# Patient Record
Sex: MALE | Race: WHITE | Employment: OTHER | URBAN - METROPOLITAN AREA
[De-identification: names, ages, dates, MRNs, and addresses within clinical notes are randomized per-mention and may not be internally consistent; named-entity substitution may affect disease eponyms.]

---

## 2017-03-06 RX ORDER — METOPROLOL SUCCINATE 50 MG/1
TABLET, EXTENDED RELEASE ORAL
Qty: 90 TABLET | Refills: 1 | OUTPATIENT
Start: 2017-03-06

## 2017-03-09 RX ORDER — METOPROLOL SUCCINATE 50 MG/1
50 TABLET, EXTENDED RELEASE ORAL DAILY
Qty: 90 TABLET | Refills: 1 | Status: SHIPPED | OUTPATIENT
Start: 2017-03-09 | End: 2017-06-23 | Stop reason: SDUPTHER

## 2017-03-15 ENCOUNTER — OFFICE VISIT (OUTPATIENT)
Dept: INTERNAL MEDICINE CLINIC | Age: 57
End: 2017-03-15

## 2017-03-15 VITALS
TEMPERATURE: 99.3 F | WEIGHT: 209.4 LBS | OXYGEN SATURATION: 98 % | DIASTOLIC BLOOD PRESSURE: 82 MMHG | SYSTOLIC BLOOD PRESSURE: 138 MMHG | HEART RATE: 102 BPM | BODY MASS INDEX: 30.05 KG/M2

## 2017-03-15 DIAGNOSIS — J00 ACUTE RHINITIS: ICD-10-CM

## 2017-03-15 DIAGNOSIS — G89.29 CHRONIC TMJ PAIN: Primary | ICD-10-CM

## 2017-03-15 DIAGNOSIS — J30.0 NONALLERGIC VASOMOTOR RHINITIS: ICD-10-CM

## 2017-03-15 DIAGNOSIS — M26.629 CHRONIC TMJ PAIN: Primary | ICD-10-CM

## 2017-03-15 PROCEDURE — 99213 OFFICE O/P EST LOW 20 MIN: CPT | Performed by: FAMILY MEDICINE

## 2017-03-15 RX ORDER — AZITHROMYCIN 250 MG/1
TABLET, FILM COATED ORAL
Qty: 1 PACKET | Refills: 0 | Status: SHIPPED | OUTPATIENT
Start: 2017-03-15 | End: 2018-02-08 | Stop reason: SDUPTHER

## 2017-03-15 RX ORDER — NAPROXEN 375 MG/1
375 TABLET ORAL 3 TIMES DAILY PRN
Qty: 60 TABLET | Refills: 1 | Status: SHIPPED | OUTPATIENT
Start: 2017-03-15 | End: 2017-12-26 | Stop reason: ALTCHOICE

## 2017-03-15 RX ORDER — PREDNISONE 20 MG/1
20 TABLET ORAL DAILY
Qty: 10 TABLET | Refills: 0 | Status: SHIPPED | OUTPATIENT
Start: 2017-03-15 | End: 2018-02-08 | Stop reason: SDUPTHER

## 2017-03-15 ASSESSMENT — ENCOUNTER SYMPTOMS
VOICE CHANGE: 0
CONSTIPATION: 0
FACIAL SWELLING: 0
TROUBLE SWALLOWING: 0
RHINORRHEA: 1
BLOOD IN STOOL: 0
COUGH: 0
SHORTNESS OF BREATH: 0
ABDOMINAL PAIN: 0
DIARRHEA: 0
WHEEZING: 0

## 2017-03-17 ENCOUNTER — TELEPHONE (OUTPATIENT)
Dept: INTERNAL MEDICINE CLINIC | Age: 57
End: 2017-03-17

## 2017-03-17 RX ORDER — PROMETHAZINE HYDROCHLORIDE AND CODEINE PHOSPHATE 6.25; 1 MG/5ML; MG/5ML
SYRUP ORAL
Qty: 240 ML | Refills: 0 | Status: SHIPPED | OUTPATIENT
Start: 2017-03-17 | End: 2017-05-12 | Stop reason: ALTCHOICE

## 2017-04-03 ENCOUNTER — TELEPHONE (OUTPATIENT)
Dept: INTERNAL MEDICINE CLINIC | Age: 57
End: 2017-04-03

## 2017-05-12 ENCOUNTER — OFFICE VISIT (OUTPATIENT)
Dept: INTERNAL MEDICINE CLINIC | Age: 57
End: 2017-05-12

## 2017-05-12 VITALS
HEART RATE: 72 BPM | TEMPERATURE: 98 F | RESPIRATION RATE: 18 BRPM | WEIGHT: 208.6 LBS | DIASTOLIC BLOOD PRESSURE: 80 MMHG | BODY MASS INDEX: 30.9 KG/M2 | OXYGEN SATURATION: 100 % | HEIGHT: 69 IN | SYSTOLIC BLOOD PRESSURE: 134 MMHG

## 2017-05-12 DIAGNOSIS — R30.0 DYSURIA: Primary | ICD-10-CM

## 2017-05-12 LAB
BILIRUBIN, POC: NEGATIVE
BLOOD URINE, POC: NEGATIVE
CLARITY, POC: CLEAR
COLOR, POC: NORMAL
GLUCOSE URINE, POC: NEGATIVE
KETONES, POC: NEGATIVE
LEUKOCYTE EST, POC: NEGATIVE
NITRITE, POC: NEGATIVE
PH, POC: 5
PROTEIN, POC: NEGATIVE
SPECIFIC GRAVITY, POC: 1
UROBILINOGEN, POC: 0.2

## 2017-05-12 PROCEDURE — 81002 URINALYSIS NONAUTO W/O SCOPE: CPT | Performed by: FAMILY MEDICINE

## 2017-05-12 PROCEDURE — 99212 OFFICE O/P EST SF 10 MIN: CPT | Performed by: FAMILY MEDICINE

## 2017-05-12 RX ORDER — FLUTICASONE PROPIONATE 50 MCG
SPRAY, SUSPENSION (ML) NASAL
Qty: 48 G | Refills: 1 | Status: SHIPPED | OUTPATIENT
Start: 2017-05-12 | End: 2017-09-21 | Stop reason: SDUPTHER

## 2017-05-12 RX ORDER — PHENAZOPYRIDINE HYDROCHLORIDE 200 MG/1
200 TABLET, FILM COATED ORAL 3 TIMES DAILY PRN
Qty: 10 TABLET | Refills: 1 | Status: SHIPPED | OUTPATIENT
Start: 2017-05-12 | End: 2017-05-15

## 2017-05-12 RX ORDER — LEVOFLOXACIN 500 MG/1
500 TABLET, FILM COATED ORAL DAILY
Qty: 2 TABLET | Refills: 0 | Status: SHIPPED | OUTPATIENT
Start: 2017-05-12 | End: 2017-05-14

## 2017-05-14 ASSESSMENT — ENCOUNTER SYMPTOMS
DIARRHEA: 0
ABDOMINAL PAIN: 0
VOICE CHANGE: 0
TROUBLE SWALLOWING: 0
BLOOD IN STOOL: 0
SHORTNESS OF BREATH: 0
CONSTIPATION: 0

## 2017-05-22 RX ORDER — DOXYCYCLINE HYCLATE 50 MG/1
50 CAPSULE ORAL 2 TIMES DAILY
Qty: 14 CAPSULE | Refills: 0 | Status: SHIPPED | OUTPATIENT
Start: 2017-05-22 | End: 2017-05-29

## 2017-06-01 ENCOUNTER — OFFICE VISIT (OUTPATIENT)
Dept: INTERNAL MEDICINE CLINIC | Age: 57
End: 2017-06-01

## 2017-06-01 VITALS
OXYGEN SATURATION: 100 % | DIASTOLIC BLOOD PRESSURE: 98 MMHG | RESPIRATION RATE: 18 BRPM | WEIGHT: 207 LBS | SYSTOLIC BLOOD PRESSURE: 164 MMHG | HEART RATE: 66 BPM | BODY MASS INDEX: 30.57 KG/M2

## 2017-06-01 DIAGNOSIS — Z11.3 SCREENING FOR STD (SEXUALLY TRANSMITTED DISEASE): ICD-10-CM

## 2017-06-01 DIAGNOSIS — I10 ESSENTIAL HYPERTENSION: ICD-10-CM

## 2017-06-01 DIAGNOSIS — N47.1 PHIMOSIS: ICD-10-CM

## 2017-06-01 PROCEDURE — 99213 OFFICE O/P EST LOW 20 MIN: CPT | Performed by: FAMILY MEDICINE

## 2017-06-01 RX ORDER — DOXYCYCLINE HYCLATE 100 MG/1
100 CAPSULE ORAL 2 TIMES DAILY
Qty: 28 CAPSULE | Refills: 0 | Status: SHIPPED | OUTPATIENT
Start: 2017-06-01 | End: 2017-06-15

## 2017-06-02 LAB — RPR: NORMAL

## 2017-06-03 LAB
HIV-1 AND HIV-2 ANTIBODIES: NEGATIVE
HSV 2 GLYCOPROTEIN G AB IGG: 0.11 IV

## 2017-06-04 ASSESSMENT — ENCOUNTER SYMPTOMS
DIARRHEA: 0
TROUBLE SWALLOWING: 0
VOICE CHANGE: 0
ABDOMINAL PAIN: 0
SHORTNESS OF BREATH: 0
BLOOD IN STOOL: 0
CONSTIPATION: 0

## 2017-06-23 ENCOUNTER — OFFICE VISIT (OUTPATIENT)
Dept: INTERNAL MEDICINE CLINIC | Age: 57
End: 2017-06-23

## 2017-06-23 VITALS
BODY MASS INDEX: 30.57 KG/M2 | OXYGEN SATURATION: 99 % | SYSTOLIC BLOOD PRESSURE: 170 MMHG | HEART RATE: 78 BPM | DIASTOLIC BLOOD PRESSURE: 96 MMHG | RESPIRATION RATE: 18 BRPM | WEIGHT: 207 LBS

## 2017-06-23 DIAGNOSIS — I42.9 CARDIOMYOPATHY (HCC): ICD-10-CM

## 2017-06-23 DIAGNOSIS — N48.9 PENILE LESION: ICD-10-CM

## 2017-06-23 DIAGNOSIS — I10 ESSENTIAL HYPERTENSION: ICD-10-CM

## 2017-06-23 DIAGNOSIS — Z23 NEED FOR PNEUMOCOCCAL VACCINATION: ICD-10-CM

## 2017-06-23 PROCEDURE — 90471 IMMUNIZATION ADMIN: CPT | Performed by: FAMILY MEDICINE

## 2017-06-23 PROCEDURE — 99214 OFFICE O/P EST MOD 30 MIN: CPT | Performed by: FAMILY MEDICINE

## 2017-06-23 PROCEDURE — 90732 PPSV23 VACC 2 YRS+ SUBQ/IM: CPT | Performed by: FAMILY MEDICINE

## 2017-06-23 RX ORDER — METOPROLOL SUCCINATE 100 MG/1
100 TABLET, EXTENDED RELEASE ORAL DAILY
Qty: 90 TABLET | Refills: 3 | Status: SHIPPED | OUTPATIENT
Start: 2017-06-23 | End: 2017-12-26 | Stop reason: SDUPTHER

## 2017-06-23 ASSESSMENT — ENCOUNTER SYMPTOMS
BLOOD IN STOOL: 0
CONSTIPATION: 0
DIARRHEA: 0
SHORTNESS OF BREATH: 0
VOICE CHANGE: 0
ABDOMINAL PAIN: 0
TROUBLE SWALLOWING: 0

## 2017-06-26 RX ORDER — ATORVASTATIN CALCIUM 40 MG/1
TABLET, FILM COATED ORAL
Qty: 90 TABLET | Refills: 1 | Status: SHIPPED | OUTPATIENT
Start: 2017-06-26 | End: 2017-12-26 | Stop reason: SDUPTHER

## 2017-06-26 RX ORDER — BENAZEPRIL HYDROCHLORIDE 40 MG/1
TABLET, FILM COATED ORAL
Qty: 90 TABLET | Refills: 1 | Status: SHIPPED | OUTPATIENT
Start: 2017-06-26 | End: 2017-12-26 | Stop reason: SDUPTHER

## 2017-06-26 RX ORDER — FLUTICASONE PROPIONATE 50 MCG
SPRAY, SUSPENSION (ML) NASAL
Qty: 48 G | Refills: 1 | Status: SHIPPED | OUTPATIENT
Start: 2017-06-26 | End: 2018-01-30 | Stop reason: SDUPTHER

## 2017-09-21 RX ORDER — BETAMETHASONE DIPROPIONATE 0.5 MG/G
LOTION TOPICAL 2 TIMES DAILY
Qty: 120 ML | Refills: 3 | Status: SHIPPED | OUTPATIENT
Start: 2017-09-21 | End: 2018-01-30 | Stop reason: SDUPTHER

## 2017-12-07 RX ORDER — ATORVASTATIN CALCIUM 40 MG/1
TABLET, FILM COATED ORAL
Qty: 90 TABLET | Refills: 1 | OUTPATIENT
Start: 2017-12-07

## 2017-12-07 RX ORDER — BENAZEPRIL HYDROCHLORIDE 40 MG/1
TABLET, FILM COATED ORAL
Qty: 90 TABLET | Refills: 1 | OUTPATIENT
Start: 2017-12-07

## 2017-12-26 ENCOUNTER — OFFICE VISIT (OUTPATIENT)
Dept: INTERNAL MEDICINE CLINIC | Age: 57
End: 2017-12-26

## 2017-12-26 VITALS
HEIGHT: 69 IN | SYSTOLIC BLOOD PRESSURE: 182 MMHG | DIASTOLIC BLOOD PRESSURE: 92 MMHG | RESPIRATION RATE: 20 BRPM | OXYGEN SATURATION: 99 % | BODY MASS INDEX: 31.22 KG/M2 | WEIGHT: 210.8 LBS | HEART RATE: 80 BPM

## 2017-12-26 DIAGNOSIS — G89.29 CHRONIC TMJ PAIN: ICD-10-CM

## 2017-12-26 DIAGNOSIS — I10 ESSENTIAL HYPERTENSION: Primary | ICD-10-CM

## 2017-12-26 DIAGNOSIS — M26.629 CHRONIC TMJ PAIN: ICD-10-CM

## 2017-12-26 DIAGNOSIS — J30.0 NONALLERGIC VASOMOTOR RHINITIS: ICD-10-CM

## 2017-12-26 DIAGNOSIS — H93.8X3 FULLNESS IN EAR, BILATERAL: ICD-10-CM

## 2017-12-26 PROCEDURE — 99214 OFFICE O/P EST MOD 30 MIN: CPT | Performed by: FAMILY MEDICINE

## 2017-12-26 RX ORDER — ATORVASTATIN CALCIUM 40 MG/1
TABLET, FILM COATED ORAL
Qty: 90 TABLET | Refills: 1 | Status: SHIPPED | OUTPATIENT
Start: 2017-12-26 | End: 2018-09-25

## 2017-12-26 RX ORDER — METOPROLOL SUCCINATE 100 MG/1
TABLET, EXTENDED RELEASE ORAL
Qty: 135 TABLET | Refills: 1 | Status: SHIPPED | OUTPATIENT
Start: 2017-12-26 | End: 2018-01-30 | Stop reason: SDUPTHER

## 2017-12-26 RX ORDER — BENAZEPRIL HYDROCHLORIDE 40 MG/1
TABLET, FILM COATED ORAL
Qty: 90 TABLET | Refills: 1 | Status: SHIPPED | OUTPATIENT
Start: 2017-12-26 | End: 2018-09-25

## 2017-12-26 ASSESSMENT — PATIENT HEALTH QUESTIONNAIRE - PHQ9
SUM OF ALL RESPONSES TO PHQ QUESTIONS 1-9: 0
SUM OF ALL RESPONSES TO PHQ9 QUESTIONS 1 & 2: 0
1. LITTLE INTEREST OR PLEASURE IN DOING THINGS: 0
2. FEELING DOWN, DEPRESSED OR HOPELESS: 0

## 2017-12-26 ASSESSMENT — ENCOUNTER SYMPTOMS
CONSTIPATION: 0
TROUBLE SWALLOWING: 0
SHORTNESS OF BREATH: 0
VOICE CHANGE: 0
DIARRHEA: 0
ABDOMINAL PAIN: 0
BLOOD IN STOOL: 0

## 2017-12-26 NOTE — PROGRESS NOTES
noted.   Psychiatric: He has a normal mood and affect. His behavior is normal.       Assessment:      1. Essential hypertension -blood pressure is elevated. we will increase the Toprol-XL to 100 mg in the morning and 50 mg at night. Continue Lotensin 40 mg daily. Patient's  BP machine seems to be not working great and was advised to try something different may be Omron 3 or 5 series   2. Nonallergic vasomotor rhinitis -continue Flonase nasally spray   3. Fullness in ear, bilateral - exam is unremarkable there is no significant earwax that requires lavage. The sensation of fullness probably is related to nasal congestion from vasomotor rhinitis   4. Chronic TMJ pain - agree with a trial of Celebrex 200 mg daily for one month            Plan:      As above.  RTC in 1 mo to check BP and for blood test

## 2018-01-30 ENCOUNTER — OFFICE VISIT (OUTPATIENT)
Dept: INTERNAL MEDICINE CLINIC | Age: 58
End: 2018-01-30

## 2018-01-30 DIAGNOSIS — M26.609 TMJ (TEMPOROMANDIBULAR JOINT SYNDROME): ICD-10-CM

## 2018-01-30 DIAGNOSIS — I42.8 OTHER CARDIOMYOPATHY (HCC): ICD-10-CM

## 2018-01-30 DIAGNOSIS — J30.0 NONALLERGIC VASOMOTOR RHINITIS: ICD-10-CM

## 2018-01-30 DIAGNOSIS — Z12.5 SCREENING PSA (PROSTATE SPECIFIC ANTIGEN): ICD-10-CM

## 2018-01-30 DIAGNOSIS — Z00.00 PREVENTATIVE HEALTH CARE: Primary | ICD-10-CM

## 2018-01-30 DIAGNOSIS — K58.0 IRRITABLE BOWEL SYNDROME WITH DIARRHEA: ICD-10-CM

## 2018-01-30 DIAGNOSIS — I10 ESSENTIAL HYPERTENSION: ICD-10-CM

## 2018-01-30 DIAGNOSIS — Z11.59 ENCOUNTER FOR HEPATITIS C SCREENING TEST FOR LOW RISK PATIENT: ICD-10-CM

## 2018-01-30 LAB
A/G RATIO: 2.4 (ref 1.1–2.2)
ALBUMIN SERPL-MCNC: 4.6 G/DL (ref 3.4–5)
ALP BLD-CCNC: 104 U/L (ref 40–129)
ALT SERPL-CCNC: 44 U/L (ref 10–40)
ANION GAP SERPL CALCULATED.3IONS-SCNC: 14 MMOL/L (ref 3–16)
AST SERPL-CCNC: 35 U/L (ref 15–37)
BASOPHILS ABSOLUTE: 0.1 K/UL (ref 0–0.2)
BASOPHILS RELATIVE PERCENT: 1.6 %
BILIRUB SERPL-MCNC: 0.7 MG/DL (ref 0–1)
BUN BLDV-MCNC: 9 MG/DL (ref 7–20)
CALCIUM SERPL-MCNC: 9.1 MG/DL (ref 8.3–10.6)
CHLORIDE BLD-SCNC: 91 MMOL/L (ref 99–110)
CHOLESTEROL, TOTAL: 154 MG/DL (ref 0–199)
CO2: 24 MMOL/L (ref 21–32)
CREAT SERPL-MCNC: 0.7 MG/DL (ref 0.9–1.3)
EOSINOPHILS ABSOLUTE: 0.3 K/UL (ref 0–0.6)
EOSINOPHILS RELATIVE PERCENT: 5.9 %
GFR AFRICAN AMERICAN: >60
GFR NON-AFRICAN AMERICAN: >60
GLOBULIN: 1.9 G/DL
GLUCOSE BLD-MCNC: 102 MG/DL (ref 70–99)
HCT VFR BLD CALC: 44.9 % (ref 40.5–52.5)
HDLC SERPL-MCNC: 60 MG/DL (ref 40–60)
HEMOGLOBIN: 15.2 G/DL (ref 13.5–17.5)
HEPATITIS C ANTIBODY INTERPRETATION: NORMAL
LDL CHOLESTEROL CALCULATED: 79 MG/DL
LYMPHOCYTES ABSOLUTE: 1.6 K/UL (ref 1–5.1)
LYMPHOCYTES RELATIVE PERCENT: 27.4 %
MCH RBC QN AUTO: 32.8 PG (ref 26–34)
MCHC RBC AUTO-ENTMCNC: 33.7 G/DL (ref 31–36)
MCV RBC AUTO: 97.2 FL (ref 80–100)
MONOCYTES ABSOLUTE: 0.8 K/UL (ref 0–1.3)
MONOCYTES RELATIVE PERCENT: 14.4 %
NEUTROPHILS ABSOLUTE: 2.9 K/UL (ref 1.7–7.7)
NEUTROPHILS RELATIVE PERCENT: 50.7 %
PDW BLD-RTO: 13 % (ref 12.4–15.4)
PLATELET # BLD: 200 K/UL (ref 135–450)
PMV BLD AUTO: 8.3 FL (ref 5–10.5)
POTASSIUM SERPL-SCNC: 4.7 MMOL/L (ref 3.5–5.1)
PROSTATE SPECIFIC ANTIGEN: 0.71 NG/ML (ref 0–4)
RBC # BLD: 4.62 M/UL (ref 4.2–5.9)
SODIUM BLD-SCNC: 129 MMOL/L (ref 136–145)
T4 FREE: 1.1 NG/DL (ref 0.9–1.8)
TOTAL PROTEIN: 6.5 G/DL (ref 6.4–8.2)
TRIGL SERPL-MCNC: 74 MG/DL (ref 0–150)
TSH SERPL DL<=0.05 MIU/L-ACNC: 2.18 UIU/ML (ref 0.27–4.2)
VLDLC SERPL CALC-MCNC: 15 MG/DL
WBC # BLD: 5.7 K/UL (ref 4–11)

## 2018-01-30 PROCEDURE — 99396 PREV VISIT EST AGE 40-64: CPT | Performed by: FAMILY MEDICINE

## 2018-01-30 PROCEDURE — 36415 COLL VENOUS BLD VENIPUNCTURE: CPT | Performed by: FAMILY MEDICINE

## 2018-01-30 RX ORDER — FLUTICASONE PROPIONATE 50 MCG
SPRAY, SUSPENSION (ML) NASAL
Qty: 48 G | Refills: 1 | Status: SHIPPED | OUTPATIENT
Start: 2018-01-30 | End: 2018-09-24 | Stop reason: SDUPTHER

## 2018-01-30 RX ORDER — BETAMETHASONE DIPROPIONATE 0.5 MG/G
LOTION TOPICAL 2 TIMES DAILY
Qty: 180 ML | Refills: 1 | Status: SHIPPED | OUTPATIENT
Start: 2018-01-30 | End: 2019-01-07

## 2018-01-30 RX ORDER — METOPROLOL SUCCINATE 100 MG/1
100 TABLET, EXTENDED RELEASE ORAL 2 TIMES DAILY
Qty: 180 TABLET | Refills: 1 | Status: SHIPPED | OUTPATIENT
Start: 2018-01-30 | End: 2018-09-25 | Stop reason: CLARIF

## 2018-01-30 ASSESSMENT — ENCOUNTER SYMPTOMS
CONSTIPATION: 0
ABDOMINAL PAIN: 0
VOICE CHANGE: 0
DIARRHEA: 0
BLOOD IN STOOL: 0
TROUBLE SWALLOWING: 0
SHORTNESS OF BREATH: 0

## 2018-01-30 NOTE — PROGRESS NOTES
Subjective:      Patient ID: Wale Weathers is a 61 y.o. male. HPI  Patient presented to the office for annual preventive healthcare. He has no particular complaint except for right wrist pain for which is scheduled to see a hand surgeon. He has had TMJ-'s saw a dentist who prescribed dental guard but did not help much he is scheduled to see orthodontist. Cardiac wise he is doing very well cardiomyopathy is considered back to normal. Blood pressure is fairly controlled although at times when under a lot of stress blood pressure is somewhat elevated. Has nonallergic rhinitis comes and goes. He takes steroids nasal spray which helps somewhat  Review of Systems   Constitutional: Negative for activity change. HENT: Negative for trouble swallowing and voice change. Eyes: Negative for visual disturbance. Respiratory: Negative for shortness of breath. Cardiovascular: Negative for chest pain and leg swelling. Gastrointestinal: Negative for abdominal pain, blood in stool, constipation and diarrhea. Genitourinary: Negative for difficulty urinating, dysuria, frequency, hematuria and scrotal swelling. Musculoskeletal: Negative for arthralgias and myalgias. Skin: Negative for rash. Neurological: Negative for dizziness. Psychiatric/Behavioral: Negative for behavioral problems. Objective:   Physical Exam   Constitutional: He is oriented to person, place, and time. He appears well-developed and well-nourished. No distress. HENT:   Head: Normocephalic. Eyes: Conjunctivae are normal.   Neck: Neck supple. No thyromegaly present. Cardiovascular: Normal rate, regular rhythm and normal heart sounds. No murmur heard. Pulmonary/Chest: Breath sounds normal. No respiratory distress. He has no wheezes. He has no rales. Abdominal: Soft. He exhibits no distension. Musculoskeletal: Normal range of motion. He exhibits no edema. Neurological: He is alert and oriented to person, place, and time. Skin: Skin is warm. No rash noted. Psychiatric: He has a normal mood and affect. His behavior is normal.       Assessment:      1. Preventative health care -Will draw blood and update health maintenance record    2. Essential hypertension - fairly controlled although at times been under a lot of stress blood pressure goes up. He takes Toprol- mg twice a day and Lotensin 40 mg daily    3. Nonallergic vasomotor rhinitis - continue Flonase nasally spray. Will add Atrovent nasally spray next visit if the still have problem with tendinitis    4. Irritable bowel syndrome with diarrhea - doing fairly well it seems he is in remission for almost a year. Continue high-fiber diet    5. Other cardiomyopathy ([facility] [address] - His recent echo showed normal LV function. He practically slow down on alcohol    6. TMJ (temporomandibular joint syndrome) - Follow-up with the dental surgeon or orthodontist    7. Screening PSA (prostate specific antigen) - draw blood for PSA    8. Encounter for hepatitis C screening test for low risk patient - draw blood for hepatitis C serology             Plan:      As above.  RTC in 3-6 mos and PRN

## 2018-01-31 ENCOUNTER — OFFICE VISIT (OUTPATIENT)
Dept: ORTHOPEDIC SURGERY | Age: 58
End: 2018-01-31

## 2018-01-31 VITALS
WEIGHT: 211 LBS | SYSTOLIC BLOOD PRESSURE: 161 MMHG | BODY MASS INDEX: 31.62 KG/M2 | DIASTOLIC BLOOD PRESSURE: 94 MMHG | HEART RATE: 61 BPM

## 2018-01-31 DIAGNOSIS — G89.29 WRIST PAIN, CHRONIC, RIGHT: Primary | ICD-10-CM

## 2018-01-31 DIAGNOSIS — M25.531 WRIST PAIN, CHRONIC, RIGHT: Primary | ICD-10-CM

## 2018-01-31 PROCEDURE — L3924 HFO WITHOUT JOINTS PRE OTS: HCPCS | Performed by: ORTHOPAEDIC SURGERY

## 2018-01-31 PROCEDURE — 99243 OFF/OP CNSLTJ NEW/EST LOW 30: CPT | Performed by: ORTHOPAEDIC SURGERY

## 2018-02-01 VITALS
WEIGHT: 211 LBS | DIASTOLIC BLOOD PRESSURE: 84 MMHG | HEART RATE: 70 BPM | OXYGEN SATURATION: 99 % | RESPIRATION RATE: 16 BRPM | BODY MASS INDEX: 31.62 KG/M2 | SYSTOLIC BLOOD PRESSURE: 134 MMHG

## 2018-02-01 NOTE — PROGRESS NOTES
Mr. Hay De Anda is a 61 y.o. right handed man  who is seen today in Hand Surgical Consultation at the request of Nadya Borja MD.    He is seen today regarding a 6 month(s) history of right basilar thumb & wrist pain with history previous of injury while golfing. He  was seen for this concern by his primary care physician; previous treatment has included has avoided aggravating activity for a few weeks, which has been somewhat effective. He  reports moderate pain located about the base of the right thumbs at the level of the [facility] Joint, no tenderness of the remaining hand, wrist, or elbow on either side. He notes today, no neurologic symptoms in the hands. Symptoms show no change over time. The patient's , past medical history, medications, allergies,  family history, social history, and review of systems have been reviewed, and dated and are recorded in the chart. Physical Exam:  Mr. Denise An's most recent vitals:  Vitals  BP: (!) 161/94  Pulse: 61  Weight: 211 lb (95.7 kg)    He is well nourished, oriented to person, place & time. He demonstrates appropriate mood and affect as well as normal gait and station. Skin: Skin color, texture, turgor normal. No rashes or lesions bilaterally. Digital range of motion is full and equal bilateral otherwise normal bilaterally. Thumb range of motion is mlldy decreased in abduction at the basilar joint on the Right, normal on the Left   Wrist range of motion is mildly accompanied by radial pain on the Right, normal on the Left  There is no evidence of gross joint instability bilaterally. Sensation is subjectively normal in the Whole Hand on the Right, normal on the Left  Vascular examination reveals normal and good capillary refill bilaterally  Swelling/enlargement is mild in the base of the thumb on the Right, normal on the Left  Maximal pain is elicited with palpation of the STT joint on the Right, normal on the Left.   The remainder of the hands

## 2018-02-08 ENCOUNTER — TELEPHONE (OUTPATIENT)
Dept: INTERNAL MEDICINE CLINIC | Age: 58
End: 2018-02-08

## 2018-02-08 RX ORDER — AZITHROMYCIN 250 MG/1
TABLET, FILM COATED ORAL
Qty: 1 PACKET | Refills: 0 | Status: SHIPPED | OUTPATIENT
Start: 2018-02-08 | End: 2019-01-04 | Stop reason: SDUPTHER

## 2018-02-08 RX ORDER — PREDNISONE 20 MG/1
20 TABLET ORAL DAILY
Qty: 10 TABLET | Refills: 0 | Status: SHIPPED | OUTPATIENT
Start: 2018-02-08 | End: 2018-02-18

## 2018-05-08 ENCOUNTER — OFFICE VISIT (OUTPATIENT)
Dept: INTERNAL MEDICINE CLINIC | Age: 58
End: 2018-05-08

## 2018-05-08 VITALS
HEART RATE: 66 BPM | SYSTOLIC BLOOD PRESSURE: 166 MMHG | RESPIRATION RATE: 20 BRPM | OXYGEN SATURATION: 100 % | DIASTOLIC BLOOD PRESSURE: 88 MMHG

## 2018-05-08 DIAGNOSIS — R73.09 ELEVATED GLUCOSE: ICD-10-CM

## 2018-05-08 DIAGNOSIS — F41.1 GAD (GENERALIZED ANXIETY DISORDER): ICD-10-CM

## 2018-05-08 DIAGNOSIS — S13.9XXA CERVICAL SPRAIN, INITIAL ENCOUNTER: Primary | ICD-10-CM

## 2018-05-08 DIAGNOSIS — I10 ESSENTIAL HYPERTENSION: ICD-10-CM

## 2018-05-08 LAB — HBA1C MFR BLD: 5.6 %

## 2018-05-08 PROCEDURE — 99214 OFFICE O/P EST MOD 30 MIN: CPT | Performed by: FAMILY MEDICINE

## 2018-05-08 PROCEDURE — 83036 HEMOGLOBIN GLYCOSYLATED A1C: CPT | Performed by: FAMILY MEDICINE

## 2018-05-08 RX ORDER — IBUPROFEN 600 MG/1
600 TABLET ORAL 3 TIMES DAILY PRN
Qty: 60 TABLET | Refills: 1 | Status: SHIPPED | OUTPATIENT
Start: 2018-05-08 | End: 2021-08-24

## 2018-05-08 RX ORDER — ESCITALOPRAM OXALATE 10 MG/1
10 TABLET ORAL DAILY
Qty: 30 TABLET | Refills: 3 | Status: SHIPPED | OUTPATIENT
Start: 2018-05-08 | End: 2019-03-06

## 2018-05-08 ASSESSMENT — ENCOUNTER SYMPTOMS
DIARRHEA: 0
VOICE CHANGE: 0
TROUBLE SWALLOWING: 0
ABDOMINAL PAIN: 0
CONSTIPATION: 0
SHORTNESS OF BREATH: 0
BLOOD IN STOOL: 0

## 2018-05-15 ENCOUNTER — OFFICE VISIT (OUTPATIENT)
Dept: ORTHOPEDIC SURGERY | Age: 58
End: 2018-05-15

## 2018-05-15 VITALS
BODY MASS INDEX: 30.06 KG/M2 | DIASTOLIC BLOOD PRESSURE: 95 MMHG | HEART RATE: 69 BPM | HEIGHT: 70 IN | WEIGHT: 210 LBS | SYSTOLIC BLOOD PRESSURE: 162 MMHG

## 2018-05-15 DIAGNOSIS — M47.812 CERVICAL SPINE ARTHRITIS: ICD-10-CM

## 2018-05-15 DIAGNOSIS — M25.512 LEFT SHOULDER PAIN, UNSPECIFIED CHRONICITY: Primary | ICD-10-CM

## 2018-05-15 PROCEDURE — 99215 OFFICE O/P EST HI 40 MIN: CPT | Performed by: ORTHOPAEDIC SURGERY

## 2018-05-15 ASSESSMENT — ENCOUNTER SYMPTOMS
BACK PAIN: 1
SORE THROAT: 1
SINUS PAIN: 1

## 2018-06-26 RX ORDER — ATORVASTATIN CALCIUM 40 MG/1
TABLET, FILM COATED ORAL
Qty: 90 TABLET | Refills: 1 | Status: SHIPPED | OUTPATIENT
Start: 2018-06-26 | End: 2018-12-19 | Stop reason: SDUPTHER

## 2018-06-26 RX ORDER — BENAZEPRIL HYDROCHLORIDE 40 MG/1
TABLET, FILM COATED ORAL
Qty: 90 TABLET | Refills: 1 | Status: SHIPPED | OUTPATIENT
Start: 2018-06-26 | End: 2018-12-19 | Stop reason: SDUPTHER

## 2018-07-05 RX ORDER — BENAZEPRIL HYDROCHLORIDE 40 MG/1
TABLET, FILM COATED ORAL
Qty: 90 TABLET | Refills: 1 | OUTPATIENT
Start: 2018-07-05

## 2018-07-05 RX ORDER — ATORVASTATIN CALCIUM 40 MG/1
TABLET, FILM COATED ORAL
Qty: 90 TABLET | Refills: 1 | OUTPATIENT
Start: 2018-07-05

## 2018-09-25 RX ORDER — FLUTICASONE PROPIONATE 50 MCG
SPRAY, SUSPENSION (ML) NASAL
Qty: 48 G | Refills: 1 | Status: SHIPPED | OUTPATIENT
Start: 2018-09-25 | End: 2019-03-06 | Stop reason: SDUPTHER

## 2018-09-25 RX ORDER — FLUTICASONE PROPIONATE 50 MCG
SPRAY, SUSPENSION (ML) NASAL
Qty: 48 G | Refills: 1 | OUTPATIENT
Start: 2018-09-25

## 2018-09-25 RX ORDER — METOPROLOL SUCCINATE 100 MG/1
TABLET, EXTENDED RELEASE ORAL
Qty: 135 TABLET | Refills: 1 | Status: SHIPPED | OUTPATIENT
Start: 2018-09-25 | End: 2019-01-07 | Stop reason: SDUPTHER

## 2018-09-25 RX ORDER — METOPROLOL SUCCINATE 100 MG/1
100 TABLET, EXTENDED RELEASE ORAL 2 TIMES DAILY
Qty: 180 TABLET | Refills: 1 | OUTPATIENT
Start: 2018-09-25

## 2018-12-19 RX ORDER — BENAZEPRIL HYDROCHLORIDE 40 MG/1
TABLET, FILM COATED ORAL
Qty: 90 TABLET | Refills: 1 | Status: SHIPPED | OUTPATIENT
Start: 2018-12-19 | End: 2019-06-10 | Stop reason: SDUPTHER

## 2018-12-19 RX ORDER — ATORVASTATIN CALCIUM 40 MG/1
TABLET, FILM COATED ORAL
Qty: 90 TABLET | Refills: 1 | Status: SHIPPED | OUTPATIENT
Start: 2018-12-19 | End: 2019-06-10 | Stop reason: SDUPTHER

## 2019-01-07 ENCOUNTER — OFFICE VISIT (OUTPATIENT)
Dept: INTERNAL MEDICINE CLINIC | Age: 59
End: 2019-01-07
Payer: COMMERCIAL

## 2019-01-07 VITALS
RESPIRATION RATE: 18 BRPM | TEMPERATURE: 98.2 F | WEIGHT: 212.6 LBS | DIASTOLIC BLOOD PRESSURE: 91 MMHG | BODY MASS INDEX: 30.5 KG/M2 | SYSTOLIC BLOOD PRESSURE: 161 MMHG | HEART RATE: 74 BPM | OXYGEN SATURATION: 99 %

## 2019-01-07 DIAGNOSIS — J30.0 NONALLERGIC VASOMOTOR RHINITIS: ICD-10-CM

## 2019-01-07 DIAGNOSIS — I10 ESSENTIAL HYPERTENSION: ICD-10-CM

## 2019-01-07 DIAGNOSIS — J06.9 ACUTE URI: Primary | ICD-10-CM

## 2019-01-07 PROCEDURE — 99213 OFFICE O/P EST LOW 20 MIN: CPT | Performed by: FAMILY MEDICINE

## 2019-01-07 RX ORDER — IPRATROPIUM BROMIDE 42 UG/1
2 SPRAY, METERED NASAL 3 TIMES DAILY
Qty: 1 BOTTLE | Refills: 3 | Status: SHIPPED | OUTPATIENT
Start: 2019-01-07 | End: 2019-03-06

## 2019-01-07 RX ORDER — AZITHROMYCIN 250 MG/1
TABLET, FILM COATED ORAL
Qty: 1 PACKET | Refills: 0 | Status: SHIPPED | OUTPATIENT
Start: 2019-01-07 | End: 2019-04-11 | Stop reason: SDUPTHER

## 2019-01-07 RX ORDER — METOPROLOL SUCCINATE 100 MG/1
100 TABLET, EXTENDED RELEASE ORAL 2 TIMES DAILY
Qty: 135 TABLET | Refills: 1 | Status: SHIPPED | OUTPATIENT
Start: 2019-01-07 | End: 2019-03-23 | Stop reason: SDUPTHER

## 2019-01-07 RX ORDER — METHYLPREDNISOLONE 4 MG/1
TABLET ORAL
Qty: 1 KIT | Refills: 1 | Status: SHIPPED | OUTPATIENT
Start: 2019-01-07 | End: 2019-01-13

## 2019-01-07 ASSESSMENT — ENCOUNTER SYMPTOMS
DIARRHEA: 0
TROUBLE SWALLOWING: 0
BLOOD IN STOOL: 0
VOICE CHANGE: 0
ABDOMINAL PAIN: 0
SINUS PAIN: 1
RHINORRHEA: 1
CONSTIPATION: 0
COUGH: 1

## 2019-01-07 ASSESSMENT — PATIENT HEALTH QUESTIONNAIRE - PHQ9
2. FEELING DOWN, DEPRESSED OR HOPELESS: 0
SUM OF ALL RESPONSES TO PHQ QUESTIONS 1-9: 0
1. LITTLE INTEREST OR PLEASURE IN DOING THINGS: 0
SUM OF ALL RESPONSES TO PHQ QUESTIONS 1-9: 0
SUM OF ALL RESPONSES TO PHQ9 QUESTIONS 1 & 2: 0

## 2019-03-06 ENCOUNTER — OFFICE VISIT (OUTPATIENT)
Dept: INTERNAL MEDICINE CLINIC | Age: 59
End: 2019-03-06
Payer: COMMERCIAL

## 2019-03-06 VITALS
HEIGHT: 69 IN | SYSTOLIC BLOOD PRESSURE: 145 MMHG | BODY MASS INDEX: 31.16 KG/M2 | DIASTOLIC BLOOD PRESSURE: 85 MMHG | HEART RATE: 66 BPM | WEIGHT: 210.4 LBS | RESPIRATION RATE: 18 BRPM

## 2019-03-06 DIAGNOSIS — I10 ESSENTIAL HYPERTENSION: ICD-10-CM

## 2019-03-06 DIAGNOSIS — F41.1 GAD (GENERALIZED ANXIETY DISORDER): ICD-10-CM

## 2019-03-06 DIAGNOSIS — Z12.5 SCREENING PSA (PROSTATE SPECIFIC ANTIGEN): ICD-10-CM

## 2019-03-06 DIAGNOSIS — K58.0 IRRITABLE BOWEL SYNDROME WITH DIARRHEA: ICD-10-CM

## 2019-03-06 DIAGNOSIS — Z00.00 PREVENTATIVE HEALTH CARE: Primary | ICD-10-CM

## 2019-03-06 PROCEDURE — 36415 COLL VENOUS BLD VENIPUNCTURE: CPT | Performed by: FAMILY MEDICINE

## 2019-03-06 PROCEDURE — 99396 PREV VISIT EST AGE 40-64: CPT | Performed by: FAMILY MEDICINE

## 2019-03-06 RX ORDER — FLUTICASONE PROPIONATE 50 MCG
2 SPRAY, SUSPENSION (ML) NASAL DAILY
Qty: 48 G | Refills: 1 | Status: SHIPPED | OUTPATIENT
Start: 2019-03-06 | End: 2019-08-21 | Stop reason: SDUPTHER

## 2019-03-06 ASSESSMENT — ENCOUNTER SYMPTOMS
BLOOD IN STOOL: 0
SHORTNESS OF BREATH: 0
VOICE CHANGE: 0
ABDOMINAL PAIN: 0
TROUBLE SWALLOWING: 0
DIARRHEA: 0
CONSTIPATION: 0

## 2019-03-07 LAB
A/G RATIO: 1.7 (ref 1.1–2.2)
ALBUMIN SERPL-MCNC: 4.3 G/DL (ref 3.4–5)
ALP BLD-CCNC: 114 U/L (ref 40–129)
ALT SERPL-CCNC: 40 U/L (ref 10–40)
ANION GAP SERPL CALCULATED.3IONS-SCNC: 14 MMOL/L (ref 3–16)
AST SERPL-CCNC: 40 U/L (ref 15–37)
BASOPHILS ABSOLUTE: 0.1 K/UL (ref 0–0.2)
BASOPHILS RELATIVE PERCENT: 1.6 %
BILIRUB SERPL-MCNC: 1.1 MG/DL (ref 0–1)
BUN BLDV-MCNC: 8 MG/DL (ref 7–20)
CALCIUM SERPL-MCNC: 9.7 MG/DL (ref 8.3–10.6)
CHLORIDE BLD-SCNC: 96 MMOL/L (ref 99–110)
CHOLESTEROL, FASTING: 160 MG/DL (ref 0–199)
CO2: 24 MMOL/L (ref 21–32)
CREAT SERPL-MCNC: 0.7 MG/DL (ref 0.9–1.3)
EOSINOPHILS ABSOLUTE: 0.4 K/UL (ref 0–0.6)
EOSINOPHILS RELATIVE PERCENT: 7.2 %
GFR AFRICAN AMERICAN: >60
GFR NON-AFRICAN AMERICAN: >60
GLOBULIN: 2.5 G/DL
GLUCOSE FASTING: 100 MG/DL (ref 70–99)
HCT VFR BLD CALC: 46.2 % (ref 40.5–52.5)
HDLC SERPL-MCNC: 57 MG/DL (ref 40–60)
HEMOGLOBIN: 15.3 G/DL (ref 13.5–17.5)
LDL CHOLESTEROL CALCULATED: 82 MG/DL
LYMPHOCYTES ABSOLUTE: 1.4 K/UL (ref 1–5.1)
LYMPHOCYTES RELATIVE PERCENT: 25.3 %
MCH RBC QN AUTO: 33 PG (ref 26–34)
MCHC RBC AUTO-ENTMCNC: 33.2 G/DL (ref 31–36)
MCV RBC AUTO: 99.4 FL (ref 80–100)
MONOCYTES ABSOLUTE: 0.9 K/UL (ref 0–1.3)
MONOCYTES RELATIVE PERCENT: 15.5 %
NEUTROPHILS ABSOLUTE: 2.8 K/UL (ref 1.7–7.7)
NEUTROPHILS RELATIVE PERCENT: 50.4 %
PDW BLD-RTO: 14 % (ref 12.4–15.4)
PLATELET # BLD: 202 K/UL (ref 135–450)
PMV BLD AUTO: 8.4 FL (ref 5–10.5)
POTASSIUM SERPL-SCNC: 4.7 MMOL/L (ref 3.5–5.1)
PROSTATE SPECIFIC ANTIGEN: 0.92 NG/ML (ref 0–4)
RBC # BLD: 4.65 M/UL (ref 4.2–5.9)
SODIUM BLD-SCNC: 134 MMOL/L (ref 136–145)
T4 FREE: 1 NG/DL (ref 0.9–1.8)
TOTAL PROTEIN: 6.8 G/DL (ref 6.4–8.2)
TRIGLYCERIDE, FASTING: 106 MG/DL (ref 0–150)
TSH SERPL DL<=0.05 MIU/L-ACNC: 2.65 UIU/ML (ref 0.27–4.2)
VLDLC SERPL CALC-MCNC: 21 MG/DL
WBC # BLD: 5.5 K/UL (ref 4–11)

## 2019-03-23 RX ORDER — METOPROLOL SUCCINATE 100 MG/1
100 TABLET, EXTENDED RELEASE ORAL 2 TIMES DAILY
Qty: 180 TABLET | Refills: 1 | Status: SHIPPED | OUTPATIENT
Start: 2019-03-23 | End: 2019-08-21 | Stop reason: SDUPTHER

## 2019-04-05 PROBLEM — Z00.00 PREVENTATIVE HEALTH CARE: Status: RESOLVED | Noted: 2019-03-06 | Resolved: 2019-04-05

## 2019-04-11 ENCOUNTER — TELEPHONE (OUTPATIENT)
Dept: INTERNAL MEDICINE CLINIC | Age: 59
End: 2019-04-11

## 2019-04-11 RX ORDER — AZITHROMYCIN 250 MG/1
TABLET, FILM COATED ORAL
Qty: 1 PACKET | Refills: 0 | Status: SHIPPED | OUTPATIENT
Start: 2019-04-11 | End: 2019-04-21

## 2019-04-11 NOTE — TELEPHONE ENCOUNTER
He is on vacation in [place] , and has come down with a sinus infection. Head congested, a lot pressure , and a lot junk coming  Out of his head. ... Would like a z-pac. Allergies none.  [facility] [phone] [facility])

## 2019-06-11 RX ORDER — ATORVASTATIN CALCIUM 40 MG/1
40 TABLET, FILM COATED ORAL DAILY
Qty: 90 TABLET | Refills: 1 | Status: SHIPPED | OUTPATIENT
Start: 2019-06-11 | End: 2019-12-18 | Stop reason: SDUPTHER

## 2019-06-11 RX ORDER — BENAZEPRIL HYDROCHLORIDE 40 MG/1
40 TABLET, FILM COATED ORAL DAILY
Qty: 90 TABLET | Refills: 1 | Status: SHIPPED | OUTPATIENT
Start: 2019-06-11 | End: 2019-12-18 | Stop reason: SDUPTHER

## 2019-08-22 RX ORDER — METOPROLOL SUCCINATE 100 MG/1
100 TABLET, EXTENDED RELEASE ORAL 2 TIMES DAILY
Qty: 180 TABLET | Refills: 0 | Status: SHIPPED | OUTPATIENT
Start: 2019-08-22 | End: 2019-12-17 | Stop reason: SDUPTHER

## 2019-08-22 RX ORDER — FLUTICASONE PROPIONATE 50 MCG
2 SPRAY, SUSPENSION (ML) NASAL DAILY
Qty: 48 G | Refills: 0 | Status: SHIPPED | OUTPATIENT
Start: 2019-08-22 | End: 2019-12-17 | Stop reason: SDUPTHER

## 2019-12-17 RX ORDER — BENAZEPRIL HYDROCHLORIDE 40 MG/1
40 TABLET, FILM COATED ORAL DAILY
Qty: 90 TABLET | Refills: 1 | Status: CANCELLED | OUTPATIENT
Start: 2019-12-17

## 2019-12-17 RX ORDER — BETAMETHASONE DIPROPIONATE 0.5 MG/ML
LOTION, AUGMENTED TOPICAL
Qty: 240 ML | Refills: 1 | Status: CANCELLED | OUTPATIENT
Start: 2019-12-17

## 2019-12-17 RX ORDER — FLUTICASONE PROPIONATE 50 MCG
2 SPRAY, SUSPENSION (ML) NASAL DAILY
Qty: 3 BOTTLE | Refills: 1 | Status: CANCELLED | OUTPATIENT
Start: 2019-12-17

## 2019-12-17 RX ORDER — ATORVASTATIN CALCIUM 40 MG/1
40 TABLET, FILM COATED ORAL DAILY
Qty: 90 TABLET | Refills: 1 | Status: CANCELLED | OUTPATIENT
Start: 2019-12-17

## 2019-12-17 RX ORDER — METOPROLOL SUCCINATE 100 MG/1
100 TABLET, EXTENDED RELEASE ORAL 2 TIMES DAILY
Qty: 180 TABLET | Refills: 1 | Status: CANCELLED | OUTPATIENT
Start: 2019-12-17

## 2019-12-18 RX ORDER — BETAMETHASONE DIPROPIONATE 0.5 MG/ML
LOTION, AUGMENTED TOPICAL
Qty: 240 ML | Refills: 1 | Status: SHIPPED | OUTPATIENT
Start: 2019-12-18 | End: 2022-01-18

## 2019-12-18 RX ORDER — BENAZEPRIL HYDROCHLORIDE 40 MG/1
40 TABLET, FILM COATED ORAL DAILY
Qty: 90 TABLET | Refills: 1 | Status: SHIPPED | OUTPATIENT
Start: 2019-12-18 | End: 2020-02-26 | Stop reason: SDUPTHER

## 2019-12-18 RX ORDER — FLUTICASONE PROPIONATE 50 MCG
2 SPRAY, SUSPENSION (ML) NASAL DAILY
Qty: 3 BOTTLE | Refills: 1 | Status: SHIPPED | OUTPATIENT
Start: 2019-12-18 | End: 2020-02-26 | Stop reason: SDUPTHER

## 2019-12-18 RX ORDER — METOPROLOL SUCCINATE 100 MG/1
100 TABLET, EXTENDED RELEASE ORAL 2 TIMES DAILY
Qty: 180 TABLET | Refills: 1 | Status: SHIPPED | OUTPATIENT
Start: 2019-12-18 | End: 2020-07-28 | Stop reason: SDUPTHER

## 2019-12-18 RX ORDER — ATORVASTATIN CALCIUM 40 MG/1
40 TABLET, FILM COATED ORAL DAILY
Qty: 90 TABLET | Refills: 1 | Status: SHIPPED | OUTPATIENT
Start: 2019-12-18 | End: 2020-07-16 | Stop reason: SDUPTHER

## 2020-02-26 ENCOUNTER — OFFICE VISIT (OUTPATIENT)
Dept: INTERNAL MEDICINE CLINIC | Age: 60
End: 2020-02-26
Payer: COMMERCIAL

## 2020-02-26 VITALS
WEIGHT: 218.4 LBS | HEART RATE: 68 BPM | RESPIRATION RATE: 18 BRPM | SYSTOLIC BLOOD PRESSURE: 158 MMHG | BODY MASS INDEX: 32.72 KG/M2 | TEMPERATURE: 97.8 F | DIASTOLIC BLOOD PRESSURE: 90 MMHG | OXYGEN SATURATION: 98 %

## 2020-02-26 PROCEDURE — 99214 OFFICE O/P EST MOD 30 MIN: CPT | Performed by: FAMILY MEDICINE

## 2020-02-26 RX ORDER — PREDNISONE 20 MG/1
20 TABLET ORAL DAILY
Qty: 10 TABLET | Refills: 0 | Status: SHIPPED | OUTPATIENT
Start: 2020-02-26 | End: 2020-02-26 | Stop reason: CLARIF

## 2020-02-26 RX ORDER — BENAZEPRIL HYDROCHLORIDE 40 MG/1
40 TABLET, FILM COATED ORAL 2 TIMES DAILY
Qty: 180 TABLET | Refills: 1 | Status: SHIPPED | OUTPATIENT
Start: 2020-02-26 | End: 2020-07-16 | Stop reason: SDUPTHER

## 2020-02-26 RX ORDER — METHYLPREDNISOLONE 4 MG/1
TABLET ORAL
Qty: 1 KIT | Refills: 0 | Status: SHIPPED | OUTPATIENT
Start: 2020-02-26 | End: 2020-03-03

## 2020-02-26 RX ORDER — FEXOFENADINE HCL 180 MG/1
180 TABLET ORAL DAILY
Qty: 30 TABLET | Refills: 0 | Status: SHIPPED | OUTPATIENT
Start: 2020-02-26 | End: 2020-03-27

## 2020-02-26 RX ORDER — FLUTICASONE PROPIONATE 50 MCG
2 SPRAY, SUSPENSION (ML) NASAL DAILY
Qty: 3 BOTTLE | Refills: 1 | Status: SHIPPED | OUTPATIENT
Start: 2020-02-26 | End: 2020-07-17 | Stop reason: SDUPTHER

## 2020-02-26 SDOH — ECONOMIC STABILITY: FOOD INSECURITY: WITHIN THE PAST 12 MONTHS, THE FOOD YOU BOUGHT JUST DIDN'T LAST AND YOU DIDN'T HAVE MONEY TO GET MORE.: NEVER TRUE

## 2020-02-26 SDOH — ECONOMIC STABILITY: FOOD INSECURITY: WITHIN THE PAST 12 MONTHS, YOU WORRIED THAT YOUR FOOD WOULD RUN OUT BEFORE YOU GOT MONEY TO BUY MORE.: NEVER TRUE

## 2020-02-26 SDOH — ECONOMIC STABILITY: TRANSPORTATION INSECURITY
IN THE PAST 12 MONTHS, HAS LACK OF TRANSPORTATION KEPT YOU FROM MEETINGS, WORK, OR FROM GETTING THINGS NEEDED FOR DAILY LIVING?: NO

## 2020-02-26 SDOH — ECONOMIC STABILITY: INCOME INSECURITY: HOW HARD IS IT FOR YOU TO PAY FOR THE VERY BASICS LIKE FOOD, HOUSING, MEDICAL CARE, AND HEATING?: NOT HARD AT ALL

## 2020-02-26 SDOH — ECONOMIC STABILITY: TRANSPORTATION INSECURITY
IN THE PAST 12 MONTHS, HAS THE LACK OF TRANSPORTATION KEPT YOU FROM MEDICAL APPOINTMENTS OR FROM GETTING MEDICATIONS?: NO

## 2020-02-26 ASSESSMENT — PATIENT HEALTH QUESTIONNAIRE - PHQ9
2. FEELING DOWN, DEPRESSED OR HOPELESS: 0
SUM OF ALL RESPONSES TO PHQ9 QUESTIONS 1 & 2: 0
SUM OF ALL RESPONSES TO PHQ QUESTIONS 1-9: 0
1. LITTLE INTEREST OR PLEASURE IN DOING THINGS: 0
SUM OF ALL RESPONSES TO PHQ QUESTIONS 1-9: 0

## 2020-02-26 NOTE — PROGRESS NOTES
Dr. Rangel Sneads [facility]   [facility], [place], [address] [place] [address],[address]  Phone: [phone]    HPI:  Chief Complaint   Patient presents with    Congestion    Ear Fullness    Cough        Nicole Justin is a 60 y.o. male here for evaluation of cold-like symptoms. Patient reports his symptoms include sneezing, post-nasal drip, sinus congestion, non-productive cough with little to no phlegm. Patient reports he has been using Flonase for his symptoms. Denies sick contacts. Patient admits he did receive the flu vaccine this year. Patient reports he recently joined a gym and is making positive health choices. Patient has a history of hypertension and reports compliance with his Lontensin and Toprol XL. Patient has a history of anxiety and reports he manages his symptoms without any psychotropic medications. Patient has a history of IBS and uses otc Metamucil if needed. Denies wheezing, fever, chills, muscle or joint pains. Denies chest pain, pressure, tightness, or palpitations. Denies shortness of breath, dyspnea on exertion The patient denies present heart burn, abdominal pain, nausea, vomiting, diarrhea, or constipation. Denies urinary frequency, incontinence, nocturia, or urinary urgency. ROS:  All others are negative, except as noted in the HPI.      Vitals:  BP Readings from Last 3 Encounters:   02/26/20 (!) 158/90   03/06/19 (!) 145/85   01/07/19 (!) 161/91       Pulse Readings from Last 3 Encounters:   02/26/20 68   03/06/19 66   01/07/19 74        SpO2 Readings from Last 3 Encounters:   02/26/20 98%   01/07/19 99%   05/08/18 100%        Wt Readings from Last 3 Encounters:   02/26/20 218 lb 6.4 oz (99.1 kg)   03/06/19 210 lb 6.4 oz (95.4 kg)   01/07/19 212 lb 9.6 oz (96.4 kg)         Medication Review:  Current Outpatient Medications   Medication Sig Dispense Refill    predniSONE (DELTASONE) 20 MG tablet Take 1 tablet by mouth daily for 10 days 10 tablet 0    fexofenadine General: No swelling. Skin:     General: Skin is warm and dry. Findings: No rash. Neurological:      Mental Status: He is alert and oriented to person, place, and time. Psychiatric:         Mood and Affect: Mood normal.         Behavior: Behavior normal.            Laboratory Studies:  Lab Results   Component Value Date    LABA1C 5.6 05/08/2018        Lab Results   Component Value Date    WBC 5.5 03/06/2019    HGB 15.3 03/06/2019    HCT 46.2 03/06/2019    MCV 99.4 03/06/2019     03/06/2019        Lab Results   Component Value Date     (L) 03/06/2019    K 4.7 03/06/2019    CL 96 (L) 03/06/2019    CO2 24 03/06/2019    BUN 8 03/06/2019    CREATININE 0.7 (L) 03/06/2019    GLUCOSE 102 (H) 01/30/2018    CALCIUM 9.7 03/06/2019    PROT 6.8 03/06/2019    LABALBU 4.3 03/06/2019    BILITOT 1.1 (H) 03/06/2019    ALKPHOS 114 03/06/2019    AST 40 (H) 03/06/2019    ALT 40 03/06/2019    LABGLOM >60 03/06/2019    GFRAA >60 03/06/2019    AGRATIO 1.7 03/06/2019    GLOB 2.5 03/06/2019       Lab Results   Component Value Date    CHOL 154 01/30/2018    TRIG 74 01/30/2018    HDL 57 03/06/2019    LDLCALC 82 03/06/2019    LABVLDL 21 03/06/2019       Lab Results   Component Value Date    TSH 2.65 03/06/2019    T4FREE 1.0 03/06/2019        No results found for: VITD25       Health Maintenance Review:  Health Maintenance Due   Topic Date Due    Lipid screen  03/06/2020    Potassium monitoring  03/06/2020    Creatinine monitoring  03/06/2020          Assessment/Plan:  1. Acute URI / 2. Nonallergic rhinitis  Acute URI presumed to be viral. Started patient on Medrol Dosepack, Allegra and Flonase nasal spray. Advised patient to increase fluid intake and have 3 bottles of Gatorade a day for 3 days. Advised patient to return to clinic if symptoms worsen or persist.   - fexofenadine (ALLEGRA) 180 MG tablet; Take 1 tablet by mouth daily  Dispense: 30 tablet; Refill: 0  - methylPREDNISolone (MEDROL DOSEPACK) 4 MG tablet;  Take

## 2020-03-04 ENCOUNTER — TELEPHONE (OUTPATIENT)
Dept: INTERNAL MEDICINE CLINIC | Age: 60
End: 2020-03-04

## 2020-03-04 RX ORDER — AZITHROMYCIN 250 MG/1
250 TABLET, FILM COATED ORAL SEE ADMIN INSTRUCTIONS
Qty: 6 TABLET | Refills: 0 | Status: SHIPPED | OUTPATIENT
Start: 2020-03-04 | End: 2020-03-09

## 2020-03-04 NOTE — TELEPHONE ENCOUNTER
Still not feeling better, head congested, coughing,  Little chest congested, ears are stopped up. Finished a medrol dose pack on Monday. Still taking Allegra too. Wondering If a z- pac would help. No fever no sore throat. Allergies none.  [facility]  [phone]

## 2020-07-17 RX ORDER — BENAZEPRIL HYDROCHLORIDE 40 MG/1
40 TABLET, FILM COATED ORAL 2 TIMES DAILY
Qty: 180 TABLET | Refills: 1 | Status: SHIPPED | OUTPATIENT
Start: 2020-07-17 | End: 2020-12-21

## 2020-07-17 RX ORDER — ATORVASTATIN CALCIUM 40 MG/1
40 TABLET, FILM COATED ORAL DAILY
Qty: 90 TABLET | Refills: 1 | Status: SHIPPED | OUTPATIENT
Start: 2020-07-17 | End: 2020-12-09

## 2020-07-17 RX ORDER — FLUTICASONE PROPIONATE 50 MCG
2 SPRAY, SUSPENSION (ML) NASAL DAILY
Qty: 3 BOTTLE | Refills: 1 | Status: SHIPPED | OUTPATIENT
Start: 2020-07-17 | End: 2021-01-05 | Stop reason: SDUPTHER

## 2020-07-28 RX ORDER — METOPROLOL SUCCINATE 100 MG/1
100 TABLET, EXTENDED RELEASE ORAL 2 TIMES DAILY
Qty: 180 TABLET | Refills: 1 | Status: CANCELLED | OUTPATIENT
Start: 2020-07-28

## 2020-07-28 RX ORDER — METOPROLOL SUCCINATE 100 MG/1
100 TABLET, EXTENDED RELEASE ORAL 2 TIMES DAILY
Qty: 180 TABLET | Refills: 1 | Status: SHIPPED | OUTPATIENT
Start: 2020-07-28 | End: 2021-02-04 | Stop reason: SDUPTHER

## 2020-12-09 RX ORDER — ATORVASTATIN CALCIUM 40 MG/1
40 TABLET, FILM COATED ORAL DAILY
Qty: 90 TABLET | Refills: 0 | Status: SHIPPED | OUTPATIENT
Start: 2020-12-09 | End: 2021-02-22

## 2020-12-21 DIAGNOSIS — I10 ESSENTIAL HYPERTENSION: ICD-10-CM

## 2021-01-04 RX ORDER — BENAZEPRIL HYDROCHLORIDE 40 MG/1
TABLET, FILM COATED ORAL
Qty: 180 TABLET | Refills: 0 | Status: SHIPPED | OUTPATIENT
Start: 2021-01-04 | End: 2021-05-03

## 2021-01-04 NOTE — TELEPHONE ENCOUNTER
Medication:   Requested Prescriptions     Pending Prescriptions Disp Refills    fluticasone (FLONASE) 50 MCG/ACT nasal spray 3 Bottle 1     Si sprays by Nasal route daily        Last Filled:      Patient Phone Number: [phone] (home) [phone] (work)    Last appt: Visit date not found   Next appt: Visit date not found    Last OARRS: No flowsheet data found.

## 2021-01-05 RX ORDER — FLUTICASONE PROPIONATE 50 MCG
2 SPRAY, SUSPENSION (ML) NASAL DAILY
Qty: 3 BOTTLE | Refills: 0 | Status: SHIPPED | OUTPATIENT
Start: 2021-01-05 | End: 2021-04-02

## 2021-02-04 RX ORDER — METOPROLOL SUCCINATE 100 MG/1
100 TABLET, EXTENDED RELEASE ORAL 2 TIMES DAILY
Qty: 180 TABLET | Refills: 0 | Status: SHIPPED | OUTPATIENT
Start: 2021-02-04 | End: 2021-05-03

## 2021-02-10 DIAGNOSIS — I10 ESSENTIAL HYPERTENSION: ICD-10-CM

## 2021-02-11 RX ORDER — BENAZEPRIL HYDROCHLORIDE 40 MG/1
TABLET, FILM COATED ORAL
Qty: 180 TABLET | Refills: 1 | OUTPATIENT
Start: 2021-02-11

## 2021-02-18 ENCOUNTER — OFFICE VISIT (OUTPATIENT)
Dept: PRIMARY CARE CLINIC | Age: 61
End: 2021-02-18
Payer: COMMERCIAL

## 2021-02-18 VITALS
TEMPERATURE: 96.4 F | SYSTOLIC BLOOD PRESSURE: 155 MMHG | WEIGHT: 213.8 LBS | HEART RATE: 69 BPM | OXYGEN SATURATION: 100 % | RESPIRATION RATE: 18 BRPM | BODY MASS INDEX: 32.04 KG/M2 | DIASTOLIC BLOOD PRESSURE: 88 MMHG

## 2021-02-18 DIAGNOSIS — Z12.5 SCREENING PSA (PROSTATE SPECIFIC ANTIGEN): ICD-10-CM

## 2021-02-18 DIAGNOSIS — Z00.00 PREVENTATIVE HEALTH CARE: ICD-10-CM

## 2021-02-18 DIAGNOSIS — R37 SEXUAL DYSFUNCTION: ICD-10-CM

## 2021-02-18 DIAGNOSIS — E78.5 HYPERLIPIDEMIA LDL GOAL <100: ICD-10-CM

## 2021-02-18 DIAGNOSIS — I10 ESSENTIAL HYPERTENSION: ICD-10-CM

## 2021-02-18 DIAGNOSIS — Z00.00 PREVENTATIVE HEALTH CARE: Primary | ICD-10-CM

## 2021-02-18 DIAGNOSIS — I42.8 NONISCHEMIC CARDIOMYOPATHY (HCC): ICD-10-CM

## 2021-02-18 LAB
A/G RATIO: 1.8 (ref 1.1–2.2)
ALBUMIN SERPL-MCNC: 4.7 G/DL (ref 3.4–5)
ALP BLD-CCNC: 118 U/L (ref 40–129)
ALT SERPL-CCNC: 31 U/L (ref 10–40)
ANION GAP SERPL CALCULATED.3IONS-SCNC: 10 MMOL/L (ref 3–16)
AST SERPL-CCNC: 38 U/L (ref 15–37)
BASOPHILS ABSOLUTE: 0.1 K/UL (ref 0–0.2)
BASOPHILS RELATIVE PERCENT: 1.3 %
BILIRUB SERPL-MCNC: 0.7 MG/DL (ref 0–1)
BUN BLDV-MCNC: 5 MG/DL (ref 7–20)
CALCIUM SERPL-MCNC: 9.7 MG/DL (ref 8.3–10.6)
CHLORIDE BLD-SCNC: 93 MMOL/L (ref 99–110)
CHOLESTEROL, FASTING: 165 MG/DL (ref 0–199)
CO2: 26 MMOL/L (ref 21–32)
CREAT SERPL-MCNC: 0.6 MG/DL (ref 0.8–1.3)
EOSINOPHILS ABSOLUTE: 0.4 K/UL (ref 0–0.6)
EOSINOPHILS RELATIVE PERCENT: 6.9 %
GFR AFRICAN AMERICAN: >60
GFR NON-AFRICAN AMERICAN: >60
GLOBULIN: 2.6 G/DL
GLUCOSE FASTING: 94 MG/DL (ref 70–99)
HCT VFR BLD CALC: 47.3 % (ref 40.5–52.5)
HDLC SERPL-MCNC: 76 MG/DL (ref 40–60)
HEMOGLOBIN: 16.1 G/DL (ref 13.5–17.5)
LDL CHOLESTEROL CALCULATED: 76 MG/DL
LYMPHOCYTES ABSOLUTE: 1.3 K/UL (ref 1–5.1)
LYMPHOCYTES RELATIVE PERCENT: 21 %
MCH RBC QN AUTO: 33.4 PG (ref 26–34)
MCHC RBC AUTO-ENTMCNC: 34.1 G/DL (ref 31–36)
MCV RBC AUTO: 98.2 FL (ref 80–100)
MONOCYTES ABSOLUTE: 1 K/UL (ref 0–1.3)
MONOCYTES RELATIVE PERCENT: 16 %
NEUTROPHILS ABSOLUTE: 3.3 K/UL (ref 1.7–7.7)
NEUTROPHILS RELATIVE PERCENT: 54.8 %
PDW BLD-RTO: 13.3 % (ref 12.4–15.4)
PLATELET # BLD: 212 K/UL (ref 135–450)
PMV BLD AUTO: 8.3 FL (ref 5–10.5)
POTASSIUM SERPL-SCNC: 4.6 MMOL/L (ref 3.5–5.1)
PROSTATE SPECIFIC ANTIGEN: 0.92 NG/ML (ref 0–4)
RBC # BLD: 4.82 M/UL (ref 4.2–5.9)
SODIUM BLD-SCNC: 129 MMOL/L (ref 136–145)
T4 FREE: 1 NG/DL (ref 0.9–1.8)
TOTAL PROTEIN: 7.3 G/DL (ref 6.4–8.2)
TRIGLYCERIDE, FASTING: 64 MG/DL (ref 0–150)
TSH SERPL DL<=0.05 MIU/L-ACNC: 2.26 UIU/ML (ref 0.27–4.2)
VLDLC SERPL CALC-MCNC: 13 MG/DL
WBC # BLD: 6 K/UL (ref 4–11)

## 2021-02-18 PROCEDURE — 99396 PREV VISIT EST AGE 40-64: CPT | Performed by: FAMILY MEDICINE

## 2021-02-18 RX ORDER — SILDENAFIL 100 MG/1
100 TABLET, FILM COATED ORAL DAILY PRN
Qty: 30 TABLET | Refills: 3 | Status: SHIPPED | OUTPATIENT
Start: 2021-02-18 | End: 2022-01-18

## 2021-02-18 RX ORDER — AMLODIPINE BESYLATE 5 MG/1
5 TABLET ORAL DAILY
Qty: 90 TABLET | Refills: 1 | Status: SHIPPED | OUTPATIENT
Start: 2021-02-18 | End: 2021-08-24

## 2021-02-18 ASSESSMENT — PATIENT HEALTH QUESTIONNAIRE - PHQ9
1. LITTLE INTEREST OR PLEASURE IN DOING THINGS: 0
2. FEELING DOWN, DEPRESSED OR HOPELESS: 0
SUM OF ALL RESPONSES TO PHQ QUESTIONS 1-9: 0

## 2021-02-18 ASSESSMENT — ENCOUNTER SYMPTOMS
ABDOMINAL PAIN: 0
BLOOD IN STOOL: 0
DIARRHEA: 0
VOICE CHANGE: 0
SHORTNESS OF BREATH: 0
CONSTIPATION: 0
TROUBLE SWALLOWING: 0

## 2021-02-18 NOTE — PROGRESS NOTES
2021    Diego Yang (:  1960) is a 63 y.o. male, here for a preventive medicine evaluation. Patient Active Problem List   Diagnosis    IBS (irritable bowel syndrome)    DOMINGO (generalized anxiety disorder)    Nonischemic cardiomyopathy (HCC)    Nonallergic vasomotor rhinitis    Essential hypertension    Allergic rhinitis due to mold    Hyperlipidemia LDL goal <100    Sexual dysfunction       Review of Systems   Constitutional: Negative for activity change. HENT: Negative for trouble swallowing and voice change. Eyes: Negative for visual disturbance. Respiratory: Negative for shortness of breath. Cardiovascular: Negative for chest pain and leg swelling. Gastrointestinal: Negative for abdominal pain, blood in stool, constipation and diarrhea. Genitourinary: Negative for difficulty urinating, dysuria, frequency, hematuria and scrotal swelling. Musculoskeletal: Negative for arthralgias and myalgias. Skin: Negative for rash. Neurological: Negative for dizziness. Psychiatric/Behavioral: Negative for behavioral problems. Prior to Visit Medications    Medication Sig Taking? Authorizing Provider   amLODIPine (NORVASC) 5 MG tablet Take 1 tablet by mouth daily Yes Sol Adames MD   sildenafil (VIAGRA) 100 MG tablet Take 1 tablet by mouth daily as needed for Erectile Dysfunction Yes Sol Adames MD   metoprolol succinate (TOPROL XL) 100 MG extended release tablet Take 1 tablet by mouth 2 times daily Yes Sol Adames MD   fluticasone (FLONASE) 50 MCG/ACT nasal spray 2 sprays by Nasal route daily Yes DREW Woodard CNP   benazepril (LOTENSIN) 40 MG tablet TAKE 1 TABLET BY MOUTH TWICE A DAY Yes DREW Woodard CNP   atorvastatin (LIPITOR) 40 MG tablet Take 1 tablet by mouth daily Yes Sol Adames MD   betamethasone, augmented, (DIPROLENE) 0.05 % lotion Apply to affected scalp areas x2 weeks.  Yes Sol Adames MD ibuprofen (ADVIL;MOTRIN) 600 MG tablet Take 1 tablet by mouth 3 times daily as needed for Pain Take with food. Yes Reinaldo Blue MD   aspirin 325 MG tablet Take 325 mg by mouth daily. Historical Provider, MD        No Known Allergies    Past Medical History:   Diagnosis Date    Allergic rhinitis     CAD (coronary artery disease)     Cardiomyopathy ([facility] [address]     resolved?  Colon polyp     Glenohumeral arthritis 2/17/2014    HTN (hypertension)     Hyperlipidemia     IBS (irritable bowel syndrome)     Low back pain     Lumbar disc herniation     Rectal lesion     Rotator cuff tear, left     Rotator cuff tendonitis 2/17/2014    Shoulder pain 2/17/2014       No past surgical history on file. Social History     Socioeconomic History    Marital status:      Spouse name: Not on file    Number of children: Not on file    Years of education: Not on file    Highest education level: Not on file   Occupational History    Not on file   Social Needs    Financial resource strain: Not hard at all   [employer] insecurity     Worry: Never true     Inability: Never true   [employer] needs     Medical: No     Non-medical: No   Tobacco Use    Smoking status: Never Smoker    Smokeless tobacco: Never Used   Substance and Sexual Activity    Alcohol use:  Yes     Alcohol/week: 12.0 standard drinks     Types: 12 Cans of beer per week    Drug use: No    Sexual activity: Yes     Partners: Female   Lifestyle    Physical activity     Days per week: Not on file     Minutes per session: Not on file    Stress: Not on file   Relationships    Social connections     Talks on phone: Not on file     Gets together: Not on file     Attends [employer] service: Not on file     Active member of club or organization: Not on file     Attends meetings of clubs or organizations: Not on file     Relationship status: Not on file    Intimate partner violence     Fear of current or ex partner: Not on file Emotionally abused: Not on file     Physically abused: Not on file     Forced sexual activity: Not on file   Other Topics Concern    Not on file   Social History Narrative    Not on file        No family history on file. ADVANCE DIRECTIVE: N, <no information>    Vitals:    02/18/21 0946   BP: (!) 155/88   Pulse: 69   Resp: 18   Temp: 96.4 °F (35.8 °C)   TempSrc: Temporal   SpO2: 100%   Weight: 213 lb 12.8 oz (97 kg)     Estimated body mass index is 32.04 kg/m² as calculated from the following:    Height as of 3/6/19: 5' 8.5\" (1.74 m). Weight as of this encounter: 213 lb 12.8 oz (97 kg). Physical Exam  Constitutional:       General: He is not in acute distress. Appearance: He is well-developed. HENT:      Head: Normocephalic. Eyes:      Conjunctiva/sclera: Conjunctivae normal.   Neck:      Musculoskeletal: Neck supple. Thyroid: No thyromegaly. Cardiovascular:      Rate and Rhythm: Normal rate and regular rhythm. Heart sounds: Normal heart sounds. No murmur. Pulmonary:      Effort: No respiratory distress. Breath sounds: Normal breath sounds. No wheezing or rales. Abdominal:      General: There is no distension. Palpations: Abdomen is soft. Musculoskeletal: Normal range of motion. Skin:     General: Skin is warm. Findings: No rash. Neurological:      Mental Status: He is alert and oriented to person, place, and time. Psychiatric:         Behavior: Behavior normal.         No flowsheet data found.     Lab Results   Component Value Date    CHOL 154 01/30/2018    CHOL 153 01/22/2016    CHOL 164 09/09/2014    CHOLFAST 160 03/06/2019    TRIG 74 01/30/2018    TRIG 52 01/22/2016    TRIG 108 09/09/2014    TRIGLYCFAST 106 03/06/2019    HDL 57 03/06/2019    HDL 60 01/30/2018    HDL 57 01/22/2016    LDLCALC 82 03/06/2019    LDLCALC 79 01/30/2018    LDLCALC 86 01/22/2016    GLUF 100 03/06/2019    GLUCOSE 102 01/30/2018    LABA1C 5.6 05/08/2018 The 10-year ASCVD risk score (Janet Summers, et al., 2013) is: 11.4%    Values used to calculate the score:      Age: 63 years      Sex: Male      Is Non- : No      Diabetic: No      Tobacco smoker: No      Systolic Blood Pressure: 908 mmHg      Is BP treated: Yes      HDL Cholesterol: 57 mg/dL      Total Cholesterol: 160 mg/dL    Immunization History   Administered Date(s) Administered    Influenza Virus Vaccine 10/03/2014, 10/14/2015, 09/27/2016, 10/26/2017, 10/15/2018, 12/10/2019, 12/18/2020    Influenza, Quadv, 6 mo and older, IM (Fluzone, Flulaval) 12/10/2019    Pneumococcal Polysaccharide ([id number]) 06/23/2017    Td, unspecified formulation 10/05/2009    Tdap (Boostrix, Adacel) 04/18/2019    Zoster Recombinant (Shingrix) 09/19/2019, 01/09/2020       Health Maintenance   Topic Date Due    Lipid screen  03/06/2020    Potassium monitoring  03/06/2020    Creatinine monitoring  03/06/2020    Colon cancer screen colonoscopy  03/29/2022    DTaP/Tdap/Td vaccine (2 - Td) 04/18/2029    Flu vaccine  Completed    Shingles Vaccine  Completed    Pneumococcal 0-64 years Vaccine  Completed    Hepatitis C screen  Completed    HIV screen  Completed    Hepatitis A vaccine  Aged Out    Hepatitis B vaccine  Aged Out    Hib vaccine  Aged Out    Meningococcal (ACWY) vaccine  Aged Out       ASSESSMENT/PLAN:  1. Preventative health care  Will draw blood and update health maintenance record. -     CBC Auto Differential; Future  -     T4, Free; Future  -     TSH without Reflex; Future  -     PSA screening; Future  -     Lipid, Fasting; Future  -     Comprehensive Metabolic Panel, Fasting; Future    2.  Essential hypertension Blood pressure is still not controlled. He might have a whitecoat syndrome. Will add amlodipine 5 mg daily. Continue Lotensin 40 mg BID and Toprol- mg twice daily. Patient reported to be considering going to \"blood pressure specialist\", Dr Dangelo Pompa  [facility] by training ) for advise. Recommend low-salt diet. 3. Hyperlipidemia LDL goal <100  Continue Lipitor 40 mg daily. Will check lipid panel today. 4. Nonischemic cardiomyopathy ([address]  Presumed to be alcohol related. LV function improved after he quit drinking. His previous cardiologist was Dr. Rogelio Haque whom he has not seen for a while. Cardiac wise he is stable. 5. Sexual dysfunction  He requested prescription for Viagra 100 mg    6. Screening PSA (prostate specific antigen)  -     PSA screening; Future      RTC in 3 mos.  Recheck BP    An electronic signature was used to authenticate this note.    --Deanne Sheffield MD on 2/18/2021 at 10:14 AM

## 2021-02-22 RX ORDER — ATORVASTATIN CALCIUM 40 MG/1
40 TABLET, FILM COATED ORAL DAILY
Qty: 90 TABLET | Refills: 1 | Status: SHIPPED | OUTPATIENT
Start: 2021-02-22 | End: 2021-10-22 | Stop reason: SDUPTHER

## 2021-02-25 ENCOUNTER — TELEPHONE (OUTPATIENT)
Dept: PRIMARY CARE CLINIC | Age: 61
End: 2021-02-25

## 2021-02-25 NOTE — TELEPHONE ENCOUNTER
He called an requested medical records to be faxed to [phone]. I need to know what records he needs faxed. Also would need a signed release form to send them info.

## 2021-04-02 RX ORDER — FLUTICASONE PROPIONATE 50 MCG
SPRAY, SUSPENSION (ML) NASAL
Qty: 3 BOTTLE | Refills: 1 | Status: SHIPPED | OUTPATIENT
Start: 2021-04-02 | End: 2021-10-04

## 2021-05-02 DIAGNOSIS — I10 ESSENTIAL HYPERTENSION: ICD-10-CM

## 2021-05-03 RX ORDER — BENAZEPRIL HYDROCHLORIDE 40 MG/1
TABLET, FILM COATED ORAL
Qty: 180 TABLET | Refills: 0 | Status: SHIPPED | OUTPATIENT
Start: 2021-05-03 | End: 2021-05-05 | Stop reason: SDUPTHER

## 2021-05-03 RX ORDER — METOPROLOL SUCCINATE 100 MG/1
100 TABLET, EXTENDED RELEASE ORAL 2 TIMES DAILY
Qty: 180 TABLET | Refills: 1 | Status: SHIPPED | OUTPATIENT
Start: 2021-05-03 | End: 2021-10-21

## 2021-05-05 ENCOUNTER — OFFICE VISIT (OUTPATIENT)
Dept: PRIMARY CARE CLINIC | Age: 61
End: 2021-05-05
Payer: COMMERCIAL

## 2021-05-05 VITALS
DIASTOLIC BLOOD PRESSURE: 79 MMHG | HEART RATE: 81 BPM | SYSTOLIC BLOOD PRESSURE: 143 MMHG | WEIGHT: 213 LBS | BODY MASS INDEX: 30.49 KG/M2 | RESPIRATION RATE: 18 BRPM | HEIGHT: 70 IN

## 2021-05-05 DIAGNOSIS — I42.8 NONISCHEMIC CARDIOMYOPATHY (HCC): ICD-10-CM

## 2021-05-05 DIAGNOSIS — J30.0 NONALLERGIC VASOMOTOR RHINITIS: ICD-10-CM

## 2021-05-05 DIAGNOSIS — F41.1 GAD (GENERALIZED ANXIETY DISORDER): ICD-10-CM

## 2021-05-05 DIAGNOSIS — E78.5 HYPERLIPIDEMIA LDL GOAL <100: ICD-10-CM

## 2021-05-05 DIAGNOSIS — I10 ESSENTIAL HYPERTENSION: ICD-10-CM

## 2021-05-05 PROCEDURE — 99214 OFFICE O/P EST MOD 30 MIN: CPT | Performed by: FAMILY MEDICINE

## 2021-05-05 RX ORDER — BENAZEPRIL HYDROCHLORIDE 40 MG/1
40 TABLET, FILM COATED ORAL DAILY
Qty: 90 TABLET | Refills: 1
Start: 2021-05-05 | End: 2021-07-27

## 2021-05-05 RX ORDER — SPIRONOLACTONE 25 MG/1
25 TABLET ORAL DAILY
Qty: 90 TABLET | Refills: 1 | Status: SHIPPED | OUTPATIENT
Start: 2021-05-05 | End: 2021-10-25

## 2021-05-05 RX ORDER — SPIRONOLACTONE 25 MG/1
TABLET ORAL
COMMUNITY
Start: 2021-04-27 | End: 2021-05-05 | Stop reason: SDUPTHER

## 2021-05-05 ASSESSMENT — ENCOUNTER SYMPTOMS
VOICE CHANGE: 0
TROUBLE SWALLOWING: 0
ABDOMINAL PAIN: 0
CONSTIPATION: 0
DIARRHEA: 0
SHORTNESS OF BREATH: 0
BLOOD IN STOOL: 0

## 2021-05-05 NOTE — PROGRESS NOTES
2021     Chana Gregorio (:  1960) is a 57 y.o. male, here for evaluation of the following medical concerns:    HPI  Patient presented to the office for follow-up  For the past couple of months  since February patient decided to go to so called \" blood pressure specialist \"Dr. Cheryl Sotelo in [facility] who make some changes  with his medicine. Initially on amlodipine 5 mg daily was increased to 10 mg daily but patient developed ankle edema so it was cut back to 5 mg daily. Lotensin 40 mg twice a day was reduced to 40 mg daily and Aldactone 25 mg was added to the regimen. Patient is still take Toprol- mg twice a day. Patient blood pressure readings  range from 744-344 systolic. He has nonischemic cardiomyopathy presumed to be alcoholic related. LV function improved after he quit drinking He used to see cardiologist Dr. Ana Christine whom he has not seen for a while because cardiac wise patient has been stable. Denies chest pain or shortness of breath. He has no weight gain. He has hyperlipidemia and takes Lipitor 40 mg daily plus aspirin    Review of Systems   Constitutional: Negative for activity change. HENT: Negative for trouble swallowing and voice change. Eyes: Negative for visual disturbance. Respiratory: Negative for shortness of breath. Cardiovascular: Negative for chest pain and leg swelling. Gastrointestinal: Negative for abdominal pain, blood in stool, constipation and diarrhea. Genitourinary: Negative for difficulty urinating, dysuria, frequency, hematuria and scrotal swelling. Musculoskeletal: Negative for arthralgias and myalgias. Skin: Negative for rash. Neurological: Negative for dizziness. Psychiatric/Behavioral: Negative for behavioral problems. Prior to Visit Medications    Medication Sig Taking?  Authorizing Provider   benazepril (LOTENSIN) 40 MG tablet Take 1 tablet by mouth daily Yes Maria T Hagen MD   spironolactone (ALDACTONE) 25 MG tablet Take 1 tablet by mouth daily Yes Dani Ross MD   metoprolol succinate (TOPROL XL) 100 MG extended release tablet Take 1 tablet by mouth 2 times daily  Patient taking differently: Take 100 mg by mouth daily  Yes Dani Ross MD   fluticasone (FLONASE) 50 MCG/ACT nasal spray SPRAY 2 SPRAYS INTO EACH NOSTRIL EVERY DAY Yes Dani Ross MD   atorvastatin (LIPITOR) 40 MG tablet Take 1 tablet by mouth daily Yes Dani Ross MD   amLODIPine (NORVASC) 5 MG tablet Take 1 tablet by mouth daily Yes Dani Ross MD   sildenafil (VIAGRA) 100 MG tablet Take 1 tablet by mouth daily as needed for Erectile Dysfunction Yes Dani Ross MD   betamethasone, augmented, (DIPROLENE) 0.05 % lotion Apply to affected scalp areas x2 weeks. Yes Dani Ross MD   ibuprofen (ADVIL;MOTRIN) 600 MG tablet Take 1 tablet by mouth 3 times daily as needed for Pain Take with food. Yes Dani Ross MD   aspirin 325 MG tablet Take 325 mg by mouth daily. Historical Provider, MD        Social History     Tobacco Use    Smoking status: Never Smoker    Smokeless tobacco: Never Used   Substance Use Topics    Alcohol use: Yes     Alcohol/week: 12.0 standard drinks     Types: 12 Cans of beer per week        Vitals:    05/05/21 1615 05/05/21 1702   BP: (!) 147/82 (!) 143/79   Pulse: 81    Resp: 18    Weight: 213 lb (96.6 kg)    Height: 5' 9.5\" (1.765 m)      Estimated body mass index is 31 kg/m² as calculated from the following:    Height as of this encounter: 5' 9.5\" (1.765 m). Weight as of this encounter: 213 lb (96.6 kg). Physical Exam  Constitutional:       Appearance: He is well-developed. HENT:      Head: Normocephalic. Eyes:      Conjunctiva/sclera: Conjunctivae normal.      Pupils: Pupils are equal, round, and reactive to light. Neck:      Musculoskeletal: Normal range of motion and neck supple. Thyroid: No thyromegaly. Cardiovascular:      Rate and Rhythm: Normal rate and regular rhythm.

## 2021-07-27 DIAGNOSIS — I10 ESSENTIAL HYPERTENSION: ICD-10-CM

## 2021-07-27 RX ORDER — BENAZEPRIL HYDROCHLORIDE 40 MG/1
40 TABLET, FILM COATED ORAL DAILY
Qty: 90 TABLET | Refills: 1 | Status: SHIPPED | OUTPATIENT
Start: 2021-07-27 | End: 2021-08-24 | Stop reason: SDUPTHER

## 2021-08-24 ENCOUNTER — OFFICE VISIT (OUTPATIENT)
Dept: PRIMARY CARE CLINIC | Age: 61
End: 2021-08-24
Payer: COMMERCIAL

## 2021-08-24 VITALS
HEART RATE: 73 BPM | TEMPERATURE: 97.4 F | DIASTOLIC BLOOD PRESSURE: 82 MMHG | SYSTOLIC BLOOD PRESSURE: 156 MMHG | RESPIRATION RATE: 18 BRPM | BODY MASS INDEX: 30.54 KG/M2 | OXYGEN SATURATION: 99 % | WEIGHT: 209.8 LBS

## 2021-08-24 DIAGNOSIS — R37 SEXUAL DYSFUNCTION: ICD-10-CM

## 2021-08-24 DIAGNOSIS — I10 ESSENTIAL HYPERTENSION: ICD-10-CM

## 2021-08-24 DIAGNOSIS — J30.0 NONALLERGIC VASOMOTOR RHINITIS: ICD-10-CM

## 2021-08-24 DIAGNOSIS — I42.8 NONISCHEMIC CARDIOMYOPATHY (HCC): ICD-10-CM

## 2021-08-24 DIAGNOSIS — E78.5 HYPERLIPIDEMIA LDL GOAL <100: ICD-10-CM

## 2021-08-24 DIAGNOSIS — F41.1 GAD (GENERALIZED ANXIETY DISORDER): ICD-10-CM

## 2021-08-24 PROCEDURE — 99214 OFFICE O/P EST MOD 30 MIN: CPT | Performed by: FAMILY MEDICINE

## 2021-08-24 RX ORDER — BENAZEPRIL HYDROCHLORIDE 40 MG/1
40 TABLET, FILM COATED ORAL 2 TIMES DAILY
Qty: 180 TABLET | Refills: 1 | Status: SHIPPED | OUTPATIENT
Start: 2021-08-24 | End: 2022-01-18

## 2021-08-24 RX ORDER — HYDROCHLOROTHIAZIDE 25 MG/1
25 TABLET ORAL EVERY MORNING
Qty: 90 TABLET | Refills: 1 | Status: SHIPPED | OUTPATIENT
Start: 2021-08-24 | End: 2021-10-25

## 2021-08-24 SDOH — ECONOMIC STABILITY: FOOD INSECURITY: WITHIN THE PAST 12 MONTHS, THE FOOD YOU BOUGHT JUST DIDN'T LAST AND YOU DIDN'T HAVE MONEY TO GET MORE.: NEVER TRUE

## 2021-08-24 SDOH — ECONOMIC STABILITY: FOOD INSECURITY: WITHIN THE PAST 12 MONTHS, YOU WORRIED THAT YOUR FOOD WOULD RUN OUT BEFORE YOU GOT MONEY TO BUY MORE.: NEVER TRUE

## 2021-08-24 ASSESSMENT — ENCOUNTER SYMPTOMS
TROUBLE SWALLOWING: 0
VOICE CHANGE: 0
DIARRHEA: 0
ABDOMINAL PAIN: 0
BLOOD IN STOOL: 0
SHORTNESS OF BREATH: 0
CONSTIPATION: 0

## 2021-08-24 ASSESSMENT — SOCIAL DETERMINANTS OF HEALTH (SDOH): HOW HARD IS IT FOR YOU TO PAY FOR THE VERY BASICS LIKE FOOD, HOUSING, MEDICAL CARE, AND HEATING?: NOT HARD AT ALL

## 2021-08-24 NOTE — PROGRESS NOTES
Working     2021     Emiliano Rai (:  1960) is a 57 y.o. male, here for evaluation of the following medical concerns:    HPI  Patient presented to the office for follow-up. He has hypertension but decided to stop amlodipine and spironolactone due to side effect. Complain of ankle edema with amlodipine unclear side effect from spironolactone. Patient is still takes Toprol- mg twice daily and Lotensin 40 mg twice daily. Blood pressure today is still elevated at 156/82. He has nonischemic cardiomyopathy and doing fairly well without chest pain shortness of breath. He goes to cardiologist Dr. Katya Fraser who told the patient not to come back since he is doing well cardiac wise. He has hyperlipidemia and reported to be compliant with a statin. He takes Lipitor 40 mg daily. He has sexual dysfunction and takes Viagra as needed. He has a chronic rhinitis both allergic and nonallergic and takes Flonase nasal spray. He has anxiety doing fairly well without any psychotropic medication and reported that he has less stressful life after he quit.working. He is now happily retired      Review of Systems   Constitutional: Negative for activity change. HENT: Negative for trouble swallowing and voice change. Eyes: Negative for visual disturbance. Respiratory: Negative for shortness of breath. Cardiovascular: Negative for chest pain and leg swelling. Gastrointestinal: Negative for abdominal pain, blood in stool, constipation and diarrhea. Genitourinary: Negative for difficulty urinating, dysuria, frequency, hematuria and scrotal swelling. Musculoskeletal: Negative for arthralgias and myalgias. Skin: Negative for rash. Neurological: Negative for dizziness. Psychiatric/Behavioral: Negative for behavioral problems. Prior to Visit Medications    Medication Sig Taking?  Authorizing Provider   benazepril (LOTENSIN) 40 MG tablet Take 1 tablet by mouth 2 times daily Yes Robert Spencer, MD   hydroCHLOROthiazide (HYDRODIURIL) 25 MG tablet Take 1 tablet by mouth every morning Yes Santos Vick MD   metoprolol succinate (TOPROL XL) 100 MG extended release tablet Take 1 tablet by mouth 2 times daily  Patient taking differently: Take 100 mg by mouth 2 times daily Taking BID as of 8/24/21 Yes Santos Vick MD   fluticasone (FLONASE) 50 MCG/ACT nasal spray SPRAY 2 SPRAYS INTO EACH NOSTRIL EVERY DAY Yes Santos Vick MD   atorvastatin (LIPITOR) 40 MG tablet Take 1 tablet by mouth daily Yes Santos Vick MD   sildenafil (VIAGRA) 100 MG tablet Take 1 tablet by mouth daily as needed for Erectile Dysfunction Yes Santos Vick MD   spironolactone (ALDACTONE) 25 MG tablet Take 1 tablet by mouth daily  Patient not taking: Reported on 8/24/2021  Santos Vick MD   betamethasone, augmented, (DIPROLENE) 0.05 % lotion Apply to affected scalp areas x2 weeks. Snatos Vick MD   aspirin 325 MG tablet Take 325 mg by mouth daily. Patient not taking: Reported on 8/24/2021  Historical Provider, MD        Social History     Tobacco Use    Smoking status: Never Smoker    Smokeless tobacco: Never Used   Substance Use Topics    Alcohol use: Yes     Alcohol/week: 12.0 standard drinks     Types: 12 Cans of beer per week        Vitals:    08/24/21 1449   BP: (!) 156/82   Pulse: 73   Resp: 18   Temp: 97.4 °F (36.3 °C)   SpO2: 99%   Weight: 209 lb 12.8 oz (95.2 kg)     Estimated body mass index is 30.54 kg/m² as calculated from the following:    Height as of 5/5/21: 5' 9.5\" (1.765 m). Weight as of this encounter: 209 lb 12.8 oz (95.2 kg). Physical Exam  Constitutional:       General: He is not in acute distress. Appearance: He is well-developed. HENT:      Head: Normocephalic. Eyes:      Conjunctiva/sclera: Conjunctivae normal.   Neck:      Thyroid: No thyromegaly. Cardiovascular:      Rate and Rhythm: Normal rate and regular rhythm. Heart sounds: Normal heart sounds. No murmur heard. Pulmonary:      Effort: No respiratory distress. Breath sounds: Normal breath sounds. No wheezing or rales. Abdominal:      General: There is no distension. Palpations: Abdomen is soft. Musculoskeletal:         General: Normal range of motion. Cervical back: Neck supple. Skin:     General: Skin is warm. Findings: No rash. Neurological:      Mental Status: He is alert and oriented to person, place, and time. Psychiatric:         Behavior: Behavior normal.         ASSESSMENT/PLAN:  1. Essential hypertension  BP is elevated. Off Amlodipine ( ankle edema) and spirnolactone ( due to side effects?). He takes Toprol  mg BID and Lotensin 50 mg BID. Will add Hctz 25 mg daily. Will check BMP next visit If sodium is low will switch back to spirnolactone  - benazepril (LOTENSIN) 40 MG tablet; Take 1 tablet by mouth 2 times daily  Dispense: 180 tablet; Refill: 1  - hydroCHLOROthiazide (HYDRODIURIL) 25 MG tablet; Take 1 tablet by mouth every morning  Dispense: 90 tablet; Refill: 1    2. Hyperlipidemia LDL goal <100  Continue Lipitor 40 mg daily. Will check lipid panel next blood draw. 3. Nonischemic cardiomyopathy ([facility] [address]  Improved. Cardiac wise he is compensated. Continue current medication. He goes to Dr. Yancy Samano     4. Sexual dysfunction  He takes Viagra as needed    5. Nonallergic vasomotor rhinitis  Continue Flonase nasal spray. Consider Atrovent next time. 6. DOMINGO (generalized anxiety disorder)  He Is doing well without any psychotropic medication.   He is now retired and have less stressful life style      RTC in [facility]    An [address] [facility] was used to authenticate this note.    --Milad Price MD on 8/24/2021 at 3:52 PM

## 2021-10-04 RX ORDER — FLUTICASONE PROPIONATE 50 MCG
SPRAY, SUSPENSION (ML) NASAL
Qty: 16 G | Refills: 1 | Status: SHIPPED | OUTPATIENT
Start: 2021-10-04 | End: 2021-10-27

## 2021-10-21 RX ORDER — METOPROLOL SUCCINATE 100 MG/1
100 TABLET, EXTENDED RELEASE ORAL 2 TIMES DAILY
Qty: 180 TABLET | Refills: 1 | Status: SHIPPED | OUTPATIENT
Start: 2021-10-21 | End: 2022-01-18 | Stop reason: SDUPTHER

## 2021-10-22 RX ORDER — ATORVASTATIN CALCIUM 40 MG/1
40 TABLET, FILM COATED ORAL DAILY
Qty: 90 TABLET | Refills: 1 | Status: SHIPPED | OUTPATIENT
Start: 2021-10-22 | End: 2022-04-15

## 2021-10-25 ENCOUNTER — OFFICE VISIT (OUTPATIENT)
Dept: PRIMARY CARE CLINIC | Age: 61
End: 2021-10-25
Payer: COMMERCIAL

## 2021-10-25 VITALS
SYSTOLIC BLOOD PRESSURE: 151 MMHG | RESPIRATION RATE: 18 BRPM | HEART RATE: 73 BPM | OXYGEN SATURATION: 97 % | DIASTOLIC BLOOD PRESSURE: 94 MMHG

## 2021-10-25 DIAGNOSIS — E78.5 HYPERLIPIDEMIA LDL GOAL <100: ICD-10-CM

## 2021-10-25 DIAGNOSIS — I10 ESSENTIAL HYPERTENSION: Primary | ICD-10-CM

## 2021-10-25 DIAGNOSIS — I42.8 NONISCHEMIC CARDIOMYOPATHY (HCC): ICD-10-CM

## 2021-10-25 DIAGNOSIS — J30.0 NONALLERGIC VASOMOTOR RHINITIS: ICD-10-CM

## 2021-10-25 PROCEDURE — 99214 OFFICE O/P EST MOD 30 MIN: CPT | Performed by: FAMILY MEDICINE

## 2021-10-25 RX ORDER — CLONIDINE HYDROCHLORIDE 0.1 MG/1
TABLET ORAL
Qty: 60 TABLET | Refills: 3 | Status: SHIPPED | OUTPATIENT
Start: 2021-10-25 | End: 2022-01-18 | Stop reason: ALTCHOICE

## 2021-10-25 ASSESSMENT — ENCOUNTER SYMPTOMS
SHORTNESS OF BREATH: 0
CONSTIPATION: 0
BLOOD IN STOOL: 0
VOICE CHANGE: 0
DIARRHEA: 0
ABDOMINAL PAIN: 0
TROUBLE SWALLOWING: 0

## 2021-10-25 NOTE — PROGRESS NOTES
10/25/2021     Roula Simmons (:  1960) is a 57 y.o. male, here for evaluation of the following medical concerns:    HPI  Patient presented to the office for follow-up. He has hypertension and has side effect with  his medication amlodipine and spironolactone which were discontinued. He was started on hydrochlorothiazide 25 mg daily. But last week complained of dizziness and having a hard time going up and down the steps. Paramedic was called and and was told that blood pressure was high at  355'B systolic and heart rate was slow at 45  Patient did not go to the emergency room. He stopped hydrochlorothiazide. He still takes Toprol- mg twice daily and Lotensin 40 mg twice daily. He has been on Toprol-XL twice a day since 2017 and has no problem with bradycardia. He has nonischemic cardiomyopathy presumed to be due to alcohol now improved since he  significantly cut back on the alcohol consumption. He goes to cardiologist Dr. Alec Hahn and from the last visit 3 years ago was told may not need to come back because he is a stable. He has hyperlipidemia controlled with Lipitor 40 mg daily. He is tolerating medication. He has nonallergic rhinitis controlled with Flonase. He denies chest pain or shortness of breath. Denies bowel or urinary disturbance. Review of Systems   Constitutional: Negative for activity change. HENT: Negative for trouble swallowing and voice change. Eyes: Negative for visual disturbance. Respiratory: Negative for shortness of breath. Cardiovascular: Negative for chest pain and leg swelling. Gastrointestinal: Negative for abdominal pain, blood in stool, constipation and diarrhea. Genitourinary: Negative for difficulty urinating, dysuria, frequency, hematuria and scrotal swelling. Musculoskeletal: Negative for arthralgias and myalgias. Skin: Negative for rash. Neurological: Negative for dizziness.    Psychiatric/Behavioral: Negative for behavioral problems. Prior to Visit Medications    Medication Sig Taking? Authorizing Provider   cloNIDine (CATAPRES) 0.1 MG tablet Take 1 tablet twice a day as needed for systolic blood pressure > 160 and above Yes Rojelio Gviens MD   metoprolol succinate (TOPROL XL) 100 MG extended release tablet TAKE 1 TABLET BY MOUTH 2 TIMES DAILY Yes Rojelio Givens MD   benazepril (LOTENSIN) 40 MG tablet Take 1 tablet by mouth 2 times daily Yes Rojelio Givens MD   atorvastatin (LIPITOR) 40 MG tablet Take 1 tablet by mouth daily  Rojelio Givens MD   fluticasone (FLONASE) 50 MCG/ACT nasal spray 2 sprays into each nostril daily. Rojelio Givens MD   sildenafil (VIAGRA) 100 MG tablet Take 1 tablet by mouth daily as needed for Erectile Dysfunction  Rojelio Givens MD   betamethasone, augmented, (DIPROLENE) 0.05 % lotion Apply to affected scalp areas x2 weeks. Rojelio Givens MD   aspirin 325 MG tablet Take 325 mg by mouth daily. Patient not taking: Reported on 8/24/2021  Historical Provider, MD        Social History     Tobacco Use    Smoking status: Never Smoker    Smokeless tobacco: Never Used   Substance Use Topics    Alcohol use: Yes     Alcohol/week: 12.0 standard drinks     Types: 12 Cans of beer per week        Vitals:    10/25/21 1454   BP: (!) 151/94   Pulse: 73   Resp: 18   SpO2: 97%     Estimated body mass index is 30.54 kg/m² as calculated from the following:    Height as of 5/5/21: 5' 9.5\" (1.765 m). Weight as of 8/24/21: 209 lb 12.8 oz (95.2 kg). Physical Exam  Constitutional:       Appearance: He is well-developed. HENT:      Head: Normocephalic. Eyes:      Conjunctiva/sclera: Conjunctivae normal.      Pupils: Pupils are equal, round, and reactive to light. Neck:      Thyroid: No thyromegaly. Cardiovascular:      Rate and Rhythm: Normal rate and regular rhythm. Heart sounds: Normal heart sounds. No murmur heard.      Pulmonary:      Effort: Pulmonary effort is normal.      Breath sounds: Normal breath sounds. Abdominal:      General: Bowel sounds are normal.      Palpations: Abdomen is soft. There is no mass. Genitourinary:     Penis: Normal.       Prostate: Normal.   Musculoskeletal:         General: Normal range of motion. Cervical back: Normal range of motion and neck supple. Skin:     General: Skin is warm. Findings: No rash. Neurological:      Mental Status: He is alert. Psychiatric:         Behavior: Behavior normal.         ASSESSMENT/PLAN:  1. Essential hypertension  Did not tolerate amlodipine, Aldactone and hydrochlorothiazide due to side effects. Will continue Toprol- mg twice daily and Lotensin 40 mg twice daily. He has upcoming appointment with cardiologist Dr. Marcel Muller. He may need another medicine for the blood pressure but meantime while waiting for Dr. Marcel Muller to see him will prescribe Catapres 0.1 mg twice daily as needed for systolic blood pressure above 160.      2. Nonischemic cardiomyopathy ([address]  He is compensated. He has no sign of heart failure. He has no cardiac arrhythmia. Continue current medication and follow-up with Dr. Marcel Muller    3. Hyperlipidemia LDL goal <100  Continue Lipitor 40 mg daily. Will check lipid panel next blood draw.     4. Nonallergic vasomotor rhinitis  Continue Flonase nasal spray as needed      RTC in 2-3  mos    An electronic signature was used to authenticate this note.    --Valdo Green MD on 10/25/2021 at 4:32 PM

## 2021-10-27 RX ORDER — FLUTICASONE PROPIONATE 50 MCG
SPRAY, SUSPENSION (ML) NASAL
Qty: 16 G | Refills: 1 | Status: SHIPPED | OUTPATIENT
Start: 2021-10-27 | End: 2021-11-26

## 2021-11-29 RX ORDER — FLUTICASONE PROPIONATE 50 MCG
SPRAY, SUSPENSION (ML) NASAL
Qty: 16 G | Refills: 2 | Status: SHIPPED | OUTPATIENT
Start: 2021-11-29 | End: 2022-02-21

## 2022-01-11 ENCOUNTER — TELEPHONE (OUTPATIENT)
Dept: PRIMARY CARE CLINIC | Age: 62
End: 2022-01-11

## 2022-01-11 NOTE — TELEPHONE ENCOUNTER
----- Message from Ashlyn Butler sent at 1/11/2022 11:19 AM EST -----  Subject: Referral Request    QUESTIONS   Reason for referral request? Routine Labs   Has the physician seen you for this condition before? Yes  Select a date? 2021-02-18  Select the Provider the patient wants to be referred to, if known (PCP or   Specialist)? Arcelia Spencer   Preferred Specialist (if applicable)? Do you already have an appointment scheduled? No  Additional Information for Provider? Pt is requesting to have all of his   routine lab orders submitted so that he can schedule his blood draw appt   ---------------------------------------------------------------------------  --------------  CALL BACK INFO  What is the best way for the office to contact you? OK to leave message on   voicemail  Preferred Call Back Phone Number?  [phone]

## 2022-01-12 NOTE — TELEPHONE ENCOUNTER
Spoke with pt. He will just do labs when he comes in for OV appt-  Scheduled for him. I did advise him that last year's labs were done in February. No guarantee that they will be covered a month early.

## 2022-01-18 ENCOUNTER — OFFICE VISIT (OUTPATIENT)
Dept: PRIMARY CARE CLINIC | Age: 62
End: 2022-01-18
Payer: COMMERCIAL

## 2022-01-18 VITALS
OXYGEN SATURATION: 98 % | BODY MASS INDEX: 31.28 KG/M2 | HEIGHT: 69 IN | SYSTOLIC BLOOD PRESSURE: 124 MMHG | HEART RATE: 68 BPM | RESPIRATION RATE: 18 BRPM | DIASTOLIC BLOOD PRESSURE: 72 MMHG | WEIGHT: 211.2 LBS

## 2022-01-18 DIAGNOSIS — I10 ESSENTIAL HYPERTENSION: ICD-10-CM

## 2022-01-18 DIAGNOSIS — E78.5 HYPERLIPIDEMIA LDL GOAL <100: ICD-10-CM

## 2022-01-18 DIAGNOSIS — Z12.5 SCREENING PSA (PROSTATE SPECIFIC ANTIGEN): ICD-10-CM

## 2022-01-18 DIAGNOSIS — Z00.00 PREVENTATIVE HEALTH CARE: Primary | ICD-10-CM

## 2022-01-18 DIAGNOSIS — I42.8 NONISCHEMIC CARDIOMYOPATHY (HCC): ICD-10-CM

## 2022-01-18 LAB
A/G RATIO: 1.7 (ref 1.1–2.2)
ALBUMIN SERPL-MCNC: 4.2 G/DL (ref 3.4–5)
ALP BLD-CCNC: 101 U/L (ref 40–129)
ALT SERPL-CCNC: 21 U/L (ref 10–40)
ANION GAP SERPL CALCULATED.3IONS-SCNC: 14 MMOL/L (ref 3–16)
AST SERPL-CCNC: 31 U/L (ref 15–37)
BASOPHILS ABSOLUTE: 0.1 K/UL (ref 0–0.2)
BASOPHILS RELATIVE PERCENT: 2 %
BILIRUB SERPL-MCNC: 0.6 MG/DL (ref 0–1)
BUN BLDV-MCNC: 7 MG/DL (ref 7–20)
CALCIUM SERPL-MCNC: 9.2 MG/DL (ref 8.3–10.6)
CHLORIDE BLD-SCNC: 99 MMOL/L (ref 99–110)
CHOLESTEROL, FASTING: 163 MG/DL (ref 0–199)
CO2: 19 MMOL/L (ref 21–32)
CREAT SERPL-MCNC: 0.6 MG/DL (ref 0.8–1.3)
EOSINOPHILS ABSOLUTE: 0.4 K/UL (ref 0–0.6)
EOSINOPHILS RELATIVE PERCENT: 9.6 %
GFR AFRICAN AMERICAN: >60
GFR NON-AFRICAN AMERICAN: >60
GLUCOSE FASTING: 83 MG/DL (ref 70–99)
HCT VFR BLD CALC: 49.1 % (ref 40.5–52.5)
HDLC SERPL-MCNC: 65 MG/DL (ref 40–60)
HEMOGLOBIN: 16.3 G/DL (ref 13.5–17.5)
LDL CHOLESTEROL CALCULATED: 76 MG/DL
LYMPHOCYTES ABSOLUTE: 1.4 K/UL (ref 1–5.1)
LYMPHOCYTES RELATIVE PERCENT: 30.8 %
MCH RBC QN AUTO: 32.9 PG (ref 26–34)
MCHC RBC AUTO-ENTMCNC: 33.2 G/DL (ref 31–36)
MCV RBC AUTO: 99 FL (ref 80–100)
MONOCYTES ABSOLUTE: 0.7 K/UL (ref 0–1.3)
MONOCYTES RELATIVE PERCENT: 15 %
NEUTROPHILS ABSOLUTE: 1.9 K/UL (ref 1.7–7.7)
NEUTROPHILS RELATIVE PERCENT: 42.6 %
PDW BLD-RTO: 13.4 % (ref 12.4–15.4)
PLATELET # BLD: 123 K/UL (ref 135–450)
PMV BLD AUTO: 8.2 FL (ref 5–10.5)
POTASSIUM SERPL-SCNC: 5 MMOL/L (ref 3.5–5.1)
PROSTATE SPECIFIC ANTIGEN: 0.84 NG/ML (ref 0–4)
RBC # BLD: 4.96 M/UL (ref 4.2–5.9)
SODIUM BLD-SCNC: 132 MMOL/L (ref 136–145)
T4 FREE: 1 NG/DL (ref 0.9–1.8)
TOTAL PROTEIN: 6.7 G/DL (ref 6.4–8.2)
TRIGLYCERIDE, FASTING: 110 MG/DL (ref 0–150)
TSH SERPL DL<=0.05 MIU/L-ACNC: 2.75 UIU/ML (ref 0.27–4.2)
VLDLC SERPL CALC-MCNC: 22 MG/DL
WBC # BLD: 4.4 K/UL (ref 4–11)

## 2022-01-18 PROCEDURE — 99396 PREV VISIT EST AGE 40-64: CPT | Performed by: FAMILY MEDICINE

## 2022-01-18 PROCEDURE — 36415 COLL VENOUS BLD VENIPUNCTURE: CPT | Performed by: FAMILY MEDICINE

## 2022-01-18 RX ORDER — METOPROLOL SUCCINATE 100 MG/1
100 TABLET, EXTENDED RELEASE ORAL 2 TIMES DAILY
Qty: 180 TABLET | Refills: 1 | Status: SHIPPED | OUTPATIENT
Start: 2022-01-18

## 2022-01-18 RX ORDER — SILDENAFIL 100 MG/1
TABLET, FILM COATED ORAL
COMMUNITY
End: 2022-01-18

## 2022-01-18 RX ORDER — SILDENAFIL 100 MG/1
100 TABLET, FILM COATED ORAL DAILY PRN
Qty: 30 TABLET | Refills: 3 | Status: SHIPPED | OUTPATIENT
Start: 2022-01-18

## 2022-01-18 RX ORDER — ASPIRIN 325 MG
325 TABLET ORAL DAILY
Qty: 30 TABLET | Refills: 1 | COMMUNITY
Start: 2022-01-18

## 2022-01-18 ASSESSMENT — ENCOUNTER SYMPTOMS
BLOOD IN STOOL: 0
VOICE CHANGE: 0
CONSTIPATION: 0
SHORTNESS OF BREATH: 0
ABDOMINAL PAIN: 0
TROUBLE SWALLOWING: 0
DIARRHEA: 0

## 2022-01-18 NOTE — PROGRESS NOTES
Well Adult Note  Name: Bharathi Christianson Date: 2022   MRN: [id number] Sex: Male   Age: 57 y.o. Ethnicity: Unavailable / Unknown   : 1960 Race: White (non-)      Quintin Burgess is here for well adult exam.  History:  Patient presented to the office for annual wellness visit. He has hypertension and been struggling to control blood pressure because of the side effect from medication including hydrochlorothiazide Aldactone and amlodipine. He went to see his cardiologist Dr. Drew Hopkins who started the patient on Entresto which seems to control his blood pressure. He also take Toprol- mg twice daily. With history of nonischemic cardiomyopathy echocardiogram was ordered and it showed normal LV function ejection fraction estimated at 55 to 60%. He has hyperlipidemia and reported to be compliant with a statin. He takes Lipitor 40 mg daily and has no side effect from the medicine. He has sex well dysfunction and requested refill of Viagra. Otherwise patient is doing fairly well. Denies chest pain or shortness of breath. Denies dizzy spells. Denies bowel or urinary disturbance. Review of Systems   Constitutional: Negative for activity change. HENT: Negative for trouble swallowing and voice change. Eyes: Negative for visual disturbance. Respiratory: Negative for shortness of breath. Cardiovascular: Negative for chest pain and leg swelling. Gastrointestinal: Negative for abdominal pain, blood in stool, constipation and diarrhea. Genitourinary: Negative for difficulty urinating, dysuria, frequency, hematuria and scrotal swelling. Musculoskeletal: Negative for arthralgias and myalgias. Skin: Negative for rash. Neurological: Negative for dizziness. Psychiatric/Behavioral: Negative for behavioral problems. No Known Allergies    Prior to Visit Medications    Medication Sig Taking?  Authorizing Provider   sacubitril-valsartan (ENTRESTO)  MG per tablet Take 1 tablet by mouth 2 times daily Yes Historical Provider, MD   sildenafil (VIAGRA) 100 MG tablet Take by mouth Yes Historical Provider, MD   sildenafil (VIAGRA) 100 MG tablet Take 1 tablet by mouth daily as needed for Erectile Dysfunction Yes Olivia Aguayo MD   metoprolol succinate (TOPROL XL) 100 MG extended release tablet Take 1 tablet by mouth 2 times daily Yes Olivia Aguayo MD   aspirin 325 MG tablet Take 1 tablet by mouth daily Yes Olivia Aguayo MD   fluticasone (FLONASE) 50 MCG/ACT nasal spray SPRAY 2 SPRAYS INTO EACH NOSTRIL EVERY DAY Yes Olivia Aguayo MD   atorvastatin (LIPITOR) 40 MG tablet Take 1 tablet by mouth daily Yes Olivia Aguayo MD   betamethasone, augmented, (DIPROLENE) 0.05 % lotion Apply to affected scalp areas x2 weeks. Olivia Aguayo MD       Past Medical History:   Diagnosis Date    Allergic rhinitis     CAD (coronary artery disease)     Cardiomyopathy ([facility]     resolved?  Colon polyp     Glenohumeral arthritis 2/17/2014    HTN (hypertension)     Hyperlipidemia     IBS (irritable bowel syndrome)     Low back pain     Lumbar disc herniation     Rectal lesion     Rotator cuff tear, left     Rotator cuff tendonitis 2/17/2014    Shoulder pain 2/17/2014       No past surgical history on file. No family history on file. Social History     Tobacco Use    Smoking status: Never Smoker    Smokeless tobacco: Never Used   Substance Use Topics    Alcohol use:  Yes     Alcohol/week: 12.0 standard drinks     Types: 12 Cans of beer per week    Drug use: No       Objective   /72   Pulse 68   Resp 18   Ht 5' 9\" (1.753 m)   Wt 211 lb 3.2 oz (95.8 kg)   SpO2 98%   BMI 31.19 kg/m²   Wt Readings from Last 3 Encounters:   01/18/22 211 lb 3.2 oz (95.8 kg)   08/24/21 209 lb 12.8 oz (95.2 kg)   05/05/21 213 lb (96.6 kg)     Additional Measurements    01/18/22 0913   Waist (Inches): 38.5 in         Physical Exam  Constitutional:       General: He is not in acute distress. Appearance: He is well-developed. HENT:      Head: Normocephalic. Eyes:      Conjunctiva/sclera: Conjunctivae normal.   Neck:      Thyroid: No thyromegaly. Cardiovascular:      Rate and Rhythm: Normal rate and regular rhythm. Heart sounds: Normal heart sounds. No murmur heard. Pulmonary:      Effort: No respiratory distress. Breath sounds: Normal breath sounds. No wheezing or rales. Abdominal:      General: There is no distension. Palpations: Abdomen is soft. Musculoskeletal:         General: Normal range of motion. Cervical back: Neck supple. Skin:     General: Skin is warm. Findings: No rash. Neurological:      Mental Status: He is alert and oriented to person, place, and time. Psychiatric:         Behavior: Behavior normal.           Assessment   Plan   1. Preventative health care  Will draw blood and update health maintenance record. He is due for colon cancer screening March of this year. -     CBC Auto Differential  -     T4, Free  -     TSH without Reflex  -     PSA screening  -     Lipid, Fasting  -     Comprehensive Metabolic Panel, Fasting    2. Essential hypertension  Better. He is now on Entresto 1 tablet twice a day plus Toprol- mg twice daily. He is no longer on Catapres and Lotensin. 3. Nonischemic cardiomyopathy ([facility] [address]  Recent echo 12/16/21 showed normal LV function with ejection fraction estimated at 55 to 60%. Continue current medication including Entresto and Toprol-XL. 4. Hyperlipidemia LDL goal <100  Continue Lipitor 40 mg daily plus aspirin 325 mg daily.     5. Screening PSA (prostate specific antigen)  -     PSA screening         Personalized Preventive Plan   Current Health Maintenance Status  Immunization History   Administered Date(s) Administered    COVID-19, J&J, PF, 0.5 mL 03/17/2021, 06/15/2021    Influenza Virus Vaccine 10/03/2014, 10/14/2015, 09/27/2016, 10/26/2017, 10/15/2018, 12/10/2019, 12/18/2020, 12/18/2021    Influenza, Ashleigh Tiffany, 6 mo and older, IM (Fluzone, Flulaval) 12/10/2019    Pneumococcal Polysaccharide ([id number]) 06/23/2017    Td, unspecified formulation 10/05/2009    Tdap (Boostrix, Adacel) 04/18/2019    Zoster Recombinant (Shingrix) 09/19/2019, 01/09/2020        Health Maintenance   Topic Date Due    Lipid screen  02/18/2022    Depression Screen  02/18/2022    Potassium monitoring  02/18/2022    Creatinine monitoring  02/18/2022    Colon cancer screen colonoscopy  03/29/2022    Diabetes screen  02/18/2024    Pneumococcal 0-64 years Vaccine (2 of 2 - PPSV23) 04/10/2024    DTaP/Tdap/Td vaccine (2 - Td or Tdap) 04/18/2029    Flu vaccine  Completed    Shingles Vaccine  Completed    COVID-19 Vaccine  Completed    Hepatitis C screen  Completed    HIV screen  Completed    Hepatitis A vaccine  Aged Out    Hepatitis B vaccine  Aged Out    Hib vaccine  Aged Out    Meningococcal (ACWY) vaccine  Aged Out     Recommendations for Echograph Due: see orders and patient instructions/AVS.    RTC in 5-6 mos

## 2022-01-24 ENCOUNTER — TELEPHONE (OUTPATIENT)
Dept: PRIMARY CARE CLINIC | Age: 62
End: 2022-01-24

## 2022-01-24 NOTE — TELEPHONE ENCOUNTER
----- Message from Calester Jade sent at 1/24/2022 12:50 PM EST -----  Subject: Message to Provider    QUESTIONS  Information for Provider? Patient called in with sinus symptoms however he   did take the covid test at home twice and was negative. ..but he did take   it again today at a lab due to his being exposed to someone with covid who   tested positive last Thursday. Please call patient back to advise on what   to do.   ---------------------------------------------------------------------------  --------------  CALL BACK INFO  What is the best way for the office to contact you? OK to leave message on   voicemail  Preferred Call Back Phone Number? [phone]  ---------------------------------------------------------------------------  --------------  SCRIPT ANSWERS  Relationship to Patient?  Self

## 2022-01-24 NOTE — TELEPHONE ENCOUNTER
Per Dr Mahmood Led:  Wait for Covid test and let us know the result. In the meantime:       These are our standing directions for anyone positive with Covid 19:    1) quarantine for 5 days. If symptoms are better, then return to usual activities. If symptoms not better, quarantine for 10 days. If getting worse, call us and tell us what symptoms. May need further treatment/work up    2) if having congestion, try Mucinex OTC    3) if fever or body aches, try Tylenol OTC    4) stay hydrated!! This means water, juice, Gatorade, soup, etc.  Stay away from caffeine products. 5) start taking OTC Vitamin D and Zinc, if not already. 6) if employed, check with employer on what needs to be done to return to work     7) if any questions, schedule a Video Visit. No in-person visits when Covid positive. 8) if there is shortness of breath, get a Pulse Oximeter at a pharmacy ([facility] has them for about $15). If O2 SAT is <88-90%, go to ER. Called and D/W pt.

## 2022-01-25 ENCOUNTER — TELEPHONE (OUTPATIENT)
Dept: PRIMARY CARE CLINIC | Age: 62
End: 2022-01-25

## 2022-01-25 NOTE — TELEPHONE ENCOUNTER
Called and discussed everything with patient. **see previous telephone message for Covid directions.

## 2022-01-25 NOTE — TELEPHONE ENCOUNTER
----- Message from Sixto Kat sent at 1/25/2022 10:32 AM EST -----  Subject: Message to Provider    QUESTIONS  Information for Provider? patient wants to let the doctor know he had a   lab test completed he is positive for covid wants a call ebony to maryann   ---------------------------------------------------------------------------  --------------  CALL BACK INFO  What is the best way for the office to contact you? OK to leave message on   voicemail  Preferred Call Back Phone Number? [phone]  ---------------------------------------------------------------------------  --------------  SCRIPT ANSWERS  Relationship to Patient?  Self

## 2022-02-21 RX ORDER — FLUTICASONE PROPIONATE 50 MCG
SPRAY, SUSPENSION (ML) NASAL
Qty: 16 G | Refills: 3 | Status: SHIPPED | OUTPATIENT
Start: 2022-02-21 | End: 2022-05-26

## 2022-04-15 RX ORDER — ATORVASTATIN CALCIUM 40 MG/1
40 TABLET, FILM COATED ORAL DAILY
Qty: 90 TABLET | Refills: 1 | Status: SHIPPED | OUTPATIENT
Start: 2022-04-15 | End: 2022-10-07

## 2022-05-18 ENCOUNTER — ANESTHESIA EVENT (OUTPATIENT)
Dept: ENDOSCOPY | Age: 62
End: 2022-05-18
Payer: COMMERCIAL

## 2022-05-19 ENCOUNTER — HOSPITAL ENCOUNTER (OUTPATIENT)
Age: 62
Setting detail: OUTPATIENT SURGERY
Discharge: HOME | End: 2022-05-19
Attending: INTERNAL MEDICINE | Admitting: INTERNAL MEDICINE
Payer: COMMERCIAL

## 2022-05-19 ENCOUNTER — ANESTHESIA (OUTPATIENT)
Dept: ENDOSCOPY | Age: 62
End: 2022-05-19
Payer: COMMERCIAL

## 2022-05-19 VITALS
WEIGHT: 205.13 LBS | SYSTOLIC BLOOD PRESSURE: 124 MMHG | RESPIRATION RATE: 16 BRPM | BODY MASS INDEX: 29.37 KG/M2 | HEART RATE: 70 BPM | TEMPERATURE: 96.4 F | DIASTOLIC BLOOD PRESSURE: 73 MMHG | OXYGEN SATURATION: 97 % | HEIGHT: 70 IN

## 2022-05-19 PROCEDURE — 2580000003 HC RX 258: Performed by: STUDENT IN AN ORGANIZED HEALTH CARE EDUCATION/TRAINING PROGRAM

## 2022-05-19 PROCEDURE — 2500000003 HC RX 250 WO HCPCS: Performed by: NURSE ANESTHETIST, CERTIFIED REGISTERED

## 2022-05-19 PROCEDURE — 3700000000 HC ANESTHESIA ATTENDED CARE: Performed by: INTERNAL MEDICINE

## 2022-05-19 PROCEDURE — 7100000011 HC PHASE II RECOVERY - ADDTL 15 MIN: Performed by: INTERNAL MEDICINE

## 2022-05-19 PROCEDURE — 3700000001 HC ADD 15 MINUTES (ANESTHESIA): Performed by: INTERNAL MEDICINE

## 2022-05-19 PROCEDURE — 2580000003 HC RX 258: Performed by: NURSE ANESTHETIST, CERTIFIED REGISTERED

## 2022-05-19 PROCEDURE — 6360000002 HC RX W HCPCS: Performed by: NURSE ANESTHETIST, CERTIFIED REGISTERED

## 2022-05-19 PROCEDURE — 7100000010 HC PHASE II RECOVERY - FIRST 15 MIN: Performed by: INTERNAL MEDICINE

## 2022-05-19 PROCEDURE — 3609027000 HC COLONOSCOPY: Performed by: INTERNAL MEDICINE

## 2022-05-19 RX ORDER — LIDOCAINE HYDROCHLORIDE 20 MG/ML
INJECTION, SOLUTION EPIDURAL; INFILTRATION; INTRACAUDAL; PERINEURAL PRN
Status: DISCONTINUED | OUTPATIENT
Start: 2022-05-19 | End: 2022-05-19 | Stop reason: SDUPTHER

## 2022-05-19 RX ORDER — SODIUM CHLORIDE 0.9 % (FLUSH) 0.9 %
5-40 SYRINGE (ML) INJECTION PRN
Status: DISCONTINUED | OUTPATIENT
Start: 2022-05-19 | End: 2022-05-19 | Stop reason: HOSPADM

## 2022-05-19 RX ORDER — SODIUM CHLORIDE 9 MG/ML
INJECTION, SOLUTION INTRAVENOUS PRN
Status: CANCELLED | OUTPATIENT
Start: 2022-05-19

## 2022-05-19 RX ORDER — SODIUM CHLORIDE 9 MG/ML
INJECTION, SOLUTION INTRAVENOUS PRN
Status: DISCONTINUED | OUTPATIENT
Start: 2022-05-19 | End: 2022-05-19 | Stop reason: HOSPADM

## 2022-05-19 RX ORDER — SODIUM CHLORIDE 0.9 % (FLUSH) 0.9 %
5-40 SYRINGE (ML) INJECTION EVERY 12 HOURS SCHEDULED
Status: CANCELLED | OUTPATIENT
Start: 2022-05-19

## 2022-05-19 RX ORDER — SODIUM CHLORIDE 0.9 % (FLUSH) 0.9 %
5-40 SYRINGE (ML) INJECTION EVERY 12 HOURS SCHEDULED
Status: DISCONTINUED | OUTPATIENT
Start: 2022-05-19 | End: 2022-05-19 | Stop reason: HOSPADM

## 2022-05-19 RX ORDER — ONDANSETRON 2 MG/ML
4 INJECTION INTRAMUSCULAR; INTRAVENOUS
Status: CANCELLED | OUTPATIENT
Start: 2022-05-19 | End: 2022-05-19

## 2022-05-19 RX ORDER — SODIUM CHLORIDE 9 MG/ML
INJECTION, SOLUTION INTRAVENOUS CONTINUOUS
Status: DISCONTINUED | OUTPATIENT
Start: 2022-05-19 | End: 2022-05-19 | Stop reason: HOSPADM

## 2022-05-19 RX ORDER — SODIUM CHLORIDE 0.9 % (FLUSH) 0.9 %
5-40 SYRINGE (ML) INJECTION PRN
Status: CANCELLED | OUTPATIENT
Start: 2022-05-19

## 2022-05-19 RX ORDER — PROPOFOL 10 MG/ML
INJECTION, EMULSION INTRAVENOUS PRN
Status: DISCONTINUED | OUTPATIENT
Start: 2022-05-19 | End: 2022-05-19 | Stop reason: SDUPTHER

## 2022-05-19 RX ORDER — SODIUM CHLORIDE 9 MG/ML
INJECTION, SOLUTION INTRAVENOUS CONTINUOUS PRN
Status: DISCONTINUED | OUTPATIENT
Start: 2022-05-19 | End: 2022-05-19 | Stop reason: SDUPTHER

## 2022-05-19 RX ORDER — PROPOFOL 10 MG/ML
INJECTION, EMULSION INTRAVENOUS CONTINUOUS PRN
Status: DISCONTINUED | OUTPATIENT
Start: 2022-05-19 | End: 2022-05-19 | Stop reason: SDUPTHER

## 2022-05-19 RX ADMIN — PROPOFOL 180 MCG/KG/MIN: 10 INJECTION, EMULSION INTRAVENOUS at 08:53

## 2022-05-19 RX ADMIN — PROPOFOL 90 MG: 10 INJECTION, EMULSION INTRAVENOUS at 08:53

## 2022-05-19 RX ADMIN — LIDOCAINE HYDROCHLORIDE 50 MG: 20 INJECTION, SOLUTION EPIDURAL; INFILTRATION; INTRACAUDAL; PERINEURAL at 08:53

## 2022-05-19 RX ADMIN — SODIUM CHLORIDE: 9 INJECTION, SOLUTION INTRAVENOUS at 08:04

## 2022-05-19 RX ADMIN — SODIUM CHLORIDE: 9 INJECTION, SOLUTION INTRAVENOUS at 08:02

## 2022-05-19 ASSESSMENT — PAIN - FUNCTIONAL ASSESSMENT: PAIN_FUNCTIONAL_ASSESSMENT: NONE - DENIES PAIN

## 2022-05-19 ASSESSMENT — ENCOUNTER SYMPTOMS: SHORTNESS OF BREATH: 0

## 2022-05-19 ASSESSMENT — LIFESTYLE VARIABLES: SMOKING_STATUS: 0

## 2022-05-19 NOTE — ANESTHESIA POSTPROCEDURE EVALUATION
Department of Anesthesiology  Postprocedure Note    Patient: Noemi Clements  MRN: [id number]  YOB: 1960  Date of evaluation: 5/19/2022  Time:  10:14 AM     Procedure Summary     Date: 05/19/22 Room / Location: [address] [place] [address]    Anesthesia Start: 3187 Anesthesia Stop: 9743    Procedure: COLORECTAL CANCER SCREENING, NOT HIGH RISK (N/A ) Diagnosis:       Screen for colon cancer      (Screen for colon cancer)    Surgeons: Ajay Cisneros MD Responsible Provider: Analia Hyman MD    Anesthesia Type: MAC ASA Status: 3          Anesthesia Type: No value filed. Muriel Phase I: Muriel Score: 10    Muriel Phase II: Muriel Score: 10    Last vitals: Reviewed and per EMR flowsheets.        Anesthesia Post Evaluation    Patient location during evaluation: PACU  Patient participation: complete - patient participated  Level of consciousness: awake and alert  Airway patency: patent  Nausea & Vomiting: no nausea and no vomiting  Complications: no  Cardiovascular status: hemodynamically stable  Respiratory status: acceptable  Hydration status: stable

## 2022-05-19 NOTE — OP NOTE
COLONOSCOPY     Patient: Fior Mckoy MRN: [id number]   YOB: 1960 Age: 60 y.o. Sex: male       Admitting Physician: Tyler Travis     Primary Care Physician: Carlton Wagoner MD      DATE OF PROCEDURE: 5/19/2022  PROCEDURE: Colonoscopy    PREOPERATIVE DIAGNOSIS: Screen for colon cancer  HPI: This is a 60y.o. year old male who presents today for colon cancer screening and screening colonoscopy. ENDOSCOPIST: Araseli John MD    POSTOPERATIVE DIAGNOSIS:    1.  Negative colonoscopy    PLAN:   1.  Screening colonoscopy in 10 years    INFORMED CONSENT:  Informed consent for colonoscopy was obtained. The benefits and risks including adverse medicine reaction and perforation have been explained. The patient's questions were answered and the patient agreed to proceed. ASA: ASA 2 - Patient with mild systemic disease with no functional limitations     SEDATION: MAC    The patient's vital signs, cardiac status, pulmonary status, abdominal status and mental status were stable for the procedure. The patient's vital signs and respiratory function as monitored by oxygen saturation remained stable. COLON PREPARATION:  The patient was given a split colon preparation and the preparation was adequate. Procedure Details: An anal exam was performed and this was unremarkable. A digital rectal exam was performed and no masses palpated. The Olympus videocolonoscope  was inserted in the rectum and carefully advanced to the cecum as identified by IC valve, crow's foot appearance and appendix. The cecum was photodocumented. The colonoscope was slowly withdrawn and retrograde examination of the colon was carefully performed with inspection around and between folds. The ascending colon and cecum were intubated twice with repeat antegrade and retrograde examination. Retroflexion in the rectum was performed. Cecum Intubated: Yes    Findings: There are no polyps or tumors.     Estimated Blood Loss:  None  Complications: None    Signed By: Lyly Hutton MD

## 2022-05-19 NOTE — H&P
Gastroenterology Outpatient History and Physical     Patient: Jaswinder Valverde MRN: [id number] Sex: male   YOB: 1960 Age: 60 y.o. Location: [address] [place] [address] 12    Date:5/19/2022  Primary Care Physician: Stella Polanco MD         Patient: Jaswinder Valverde    Physician: Sandra Gardner MD    History of Present Illness: colon cancer screening  Review of Systems:  Weight Loss: No  Dysphagia: No  Dyspepsia: No  History:  Past Medical History:   Diagnosis Date    Allergic rhinitis     CAD (coronary artery disease)     saw in past 6 months and no issues    Cardiomyopathy ([address]     resolved?  Colon polyp     Glenohumeral arthritis 2/17/2014    HTN (hypertension)     Hyperlipidemia     IBS (irritable bowel syndrome)     Low back pain     Lumbar disc herniation     Rectal lesion     Rotator cuff tear, left     Rotator cuff tendonitis 2/17/2014    Shoulder pain 2/17/2014      Past Surgical History:   Procedure Laterality Date    ROTATOR CUFF REPAIR Left       Social History     Socioeconomic History    Marital status:      Spouse name: None    Number of children: None    Years of education: None    Highest education level: None   Occupational History    None   Tobacco Use    Smoking status: Never Smoker    Smokeless tobacco: Never Used   Vaping Use    Vaping Use: Never used   Substance and Sexual Activity    Alcohol use:  Yes     Alcohol/week: 6.0 standard drinks     Types: 6 Cans of beer per week     Comment: daily    Drug use: No    Sexual activity: Yes     Partners: Female   Other Topics Concern    None   Social History Narrative    None     Social Determinants of Health     Financial Resource Strain: Low Risk     Difficulty of Paying Living Expenses: Not hard at all   Food Insecurity: No Food Insecurity    Worried About Running Out of Food in the Last Year: Never true    Natan of Food in the Last Year: Never true   Transportation Needs:     Lack of Transportation (Medical): Not on file    Lack of Transportation (Non-Medical): Not on file   Physical Activity:     Days of Exercise per Week: Not on file    Minutes of Exercise per Session: Not on file   Stress:     Feeling of Stress : Not on file   Social Connections:     Frequency of Communication with Friends and Family: Not on file    Frequency of Social Gatherings with Friends and Family: Not on file    Attends [employer] Services: Not on file    Active Member of [address] [place] [address] or Organizations: Not on file    Attends Club or Organization Meetings: Not on file    Marital Status: Not on file   Intimate Partner Violence:     Fear of Current or Ex-Partner: Not on file    Emotionally Abused: Not on file    Physically Abused: Not on file    Sexually Abused: Not on file   Housing Stability:     Unable to Pay for Housing in the Last Year: Not on file    Number of [place] in the Last Year: Not on file    Unstable Housing in the Last Year: Not on file      History reviewed. No pertinent family history. Allergies: No Known Allergies  Medications:   Prior to Admission medications    Medication Sig Start Date End Date Taking?  Authorizing Provider   atorvastatin (LIPITOR) 40 MG tablet Take 1 tablet by mouth daily 4/15/22   Clemon Riedel, MD   fluticasone [facility]) 50 MCG/ACT nasal spray SPRAY 2 SPRAYS INTO EACH NOSTRIL EVERY DAY 2/21/22   Randy Caceres MD   sacubitril-valsartan (ENTRESTO)  MG per tablet Take 1 tablet by mouth 2 times daily 11/18/21   Historical Provider, MD   sildenafil (VIAGRA) 100 MG tablet Take 1 tablet by mouth daily as needed for Erectile Dysfunction 1/18/22   Randy Caceres MD   metoprolol succinate (TOPROL XL) 100 MG extended release tablet Take 1 tablet by mouth 2 times daily 1/18/22   Randy Caceres MD   aspirin 325 MG tablet Take 1 tablet by mouth daily 1/18/22   Randy Caceres MD       Vital Signs: BP (!) 161/90   Pulse 73   Temp 96.4 °F (35.8 °C) (Temporal) Resp 16   Ht 5' 10\" (1.778 m)   Wt 205 lb 2 oz (93 kg)   SpO2 100%   BMI 29.43 kg/m²   Physical Exam:   Heart: regular   Lungs: non-labored breathing  Mental status:  Alert and oriented    ASA score:  ASA 2 - Patient with mild systemic disease with no functional limitations{  Mallimpati score:  2     Planned Procedure: colonoscopy    Planned Sedation:  Monitored anesthesia.     Signed By: Nigel Ruano MD   May 19, 2022

## 2022-05-19 NOTE — ANESTHESIA PRE PROCEDURE
Department of Anesthesiology  Preprocedure Note       Name:  Flakito Gupta   Age:  60 y.o.  :  1960                                          MRN:  [id number]         Date:  2022      Surgeon: Fe Butler):  Anna Lynne MD    Procedure: Procedure(s):  COLORECTAL CANCER SCREENING, NOT HIGH RISK    Medications prior to admission:   Prior to Admission medications    Medication Sig Start Date End Date Taking?  Authorizing Provider   atorvastatin (LIPITOR) 40 MG tablet Take 1 tablet by mouth daily 4/15/22   Blaire Orr MD   fluticasone [facility] Sees) 50 MCG/ACT nasal spray SPRAY 2 SPRAYS INTO EACH NOSTRIL EVERY DAY 22   Hebert Pabon MD   sacubitril-valsartan (ENTRESTO)  MG per tablet Take 1 tablet by mouth 2 times daily 21   Historical Provider, MD   sildenafil (VIAGRA) 100 MG tablet Take 1 tablet by mouth daily as needed for Erectile Dysfunction 22   Hebert Pabon MD   metoprolol succinate (TOPROL XL) 100 MG extended release tablet Take 1 tablet by mouth 2 times daily 22   Hebert Pabon MD   aspirin 325 MG tablet Take 1 tablet by mouth daily 22   Hebert Pabon MD       Current medications:    Current Facility-Administered Medications   Medication Dose Route Frequency Provider Last Rate Last Admin    0.9 % sodium chloride infusion   IntraVENous Continuous Sophia Orellana MD        sodium chloride flush 0.9 % injection 5-40 mL  5-40 mL IntraVENous 2 times per day Sophia Orellana MD        sodium chloride flush 0.9 % injection 5-40 mL  5-40 mL IntraVENous PRN Sophia Orellana MD        0.9 % sodium chloride infusion   IntraVENous PRN Sophia Orellana  mL/hr at 22 0802 New Bag at 22 0802       Allergies:  No Known Allergies    Problem List:    Patient Active Problem List   Diagnosis Code    IBS (irritable bowel syndrome) K58.9    DOMINGO (generalized anxiety disorder) F41.1    Nonischemic cardiomyopathy ([facility] I42.8    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  Colon polyp     Glenohumeral arthritis 2/17/2014    HTN (hypertension)     Hyperlipidemia     IBS (irritable bowel syndrome)     Low back pain     Lumbar disc herniation     Rectal lesion     Rotator cuff tear, left     Rotator cuff tendonitis 2/17/2014    Shoulder pain 2/17/2014       Past Surgical History:        Procedure Laterality Date    ROTATOR CUFF REPAIR Left        Social History:    Social History     Tobacco Use    Smoking status: Never Smoker    Smokeless tobacco: Never Used   Substance Use Topics    Alcohol use: Yes     Alcohol/week: 6.0 standard drinks     Types: 6 Cans of beer per week     Comment: daily                                Counseling given: Not Answered      Vital Signs (Current):   Vitals:    05/17/22 1144 05/19/22 0757   BP:  (!) 161/90   Pulse:  73   Resp:  16   Temp:  96.4 °F (35.8 °C)   TempSrc:  Temporal   SpO2:  100%   Weight: 205 lb (93 kg) 205 lb 2 oz (93 kg)   Height: 5' 10\" (1.778 m) 5' 10\" (1.778 m)                                              BP Readings from Last 3 Encounters:   05/19/22 (!) 161/90   01/18/22 124/72   10/25/21 (!) 151/94       NPO Status: Time of last liquid consumption: 0000 (finished prep )                        Time of last solid consumption: 2000                        Date of last liquid consumption: 05/19/22                        Date of last solid food consumption: 05/17/22    BMI:   Wt Readings from Last 3 Encounters:   05/19/22 205 lb 2 oz (93 kg)   01/18/22 211 lb 3.2 oz (95.8 kg)   08/24/21 209 lb 12.8 oz (95.2 kg)     Body mass index is 29.43 kg/m².     CBC:   Lab Results   Component Value Date WBC 4.4 01/18/2022    RBC 4.96 01/18/2022    HGB 16.3 01/18/2022    HCT 49.1 01/18/2022    MCV 99.0 01/18/2022    RDW 13.4 01/18/2022     01/18/2022       CMP:   Lab Results   Component Value Date     01/18/2022    K 5.0 01/18/2022    CL 99 01/18/2022    CO2 19 01/18/2022    BUN 7 01/18/2022    CREATININE 0.6 01/18/2022    GFRAA >60 01/18/2022    AGRATIO 1.7 01/18/2022    LABGLOM >60 01/18/2022    GLUCOSE 102 01/30/2018    PROT 6.7 01/18/2022    CALCIUM 9.2 01/18/2022    BILITOT 0.6 01/18/2022    ALKPHOS 101 01/18/2022    AST 31 01/18/2022    ALT 21 01/18/2022       POC Tests: No results for input(s): POCGLU, POCNA, POCK, POCCL, POCBUN, POCHEMO, POCHCT in the last 72 hours. Coags: No results found for: PROTIME, INR, APTT    HCG (If Applicable): No results found for: PREGTESTUR, PREGSERUM, HCG, HCGQUANT     ABGs: No results found for: PHART, PO2ART, VVG2PDA, HDT4GTA, BEART, O7NRPWNN     Type & Screen (If Applicable):  No results found for: LABABO, LABRH    Drug/Infectious Status (If Applicable):  No results found for: HIV, HEPCAB    COVID-19 Screening (If Applicable): No results found for: COVID19        Anesthesia Evaluation  Patient summary reviewed   history of anesthetic complications: PONV. Airway: Mallampati: II  TM distance: >3 FB   Neck ROM: full  Mouth opening: > = 3 FB Dental:      Comment: No loose teeth    Pulmonary: breath sounds clear to auscultation      (-) COPD, asthma, shortness of breath, recent URI, sleep apnea and not a current smoker                           Cardiovascular:    (+) hypertension:, CAD:, hyperlipidemia        Rhythm: regular  Rate: normal  Echocardiogram reviewed               ROS comment: Study Date: 12/16/2021 02:58 PM     Summary:   Overall left ventricular ejection fraction is estimated to be 55-60%. The left ventricular wall motion is normal.   The diastolic function is impaired and classified as Grade 1 (impaired   relaxation).         Neuro/Psych:   (+)

## 2022-05-26 RX ORDER — FLUTICASONE PROPIONATE 50 MCG
SPRAY, SUSPENSION (ML) NASAL
Qty: 3 EACH | Refills: 1 | Status: SHIPPED | OUTPATIENT
Start: 2022-05-26

## 2022-06-28 ENCOUNTER — TELEPHONE (OUTPATIENT)
Dept: PRIMARY CARE CLINIC | Age: 62
End: 2022-06-28

## 2022-06-28 RX ORDER — METHYLPREDNISOLONE 4 MG/1
TABLET ORAL
Qty: 1 KIT | Refills: 0 | Status: SHIPPED | OUTPATIENT
Start: 2022-06-28 | End: 2022-07-04

## 2022-06-28 RX ORDER — FEXOFENADINE HCL 180 MG/1
180 TABLET ORAL DAILY
Qty: 30 TABLET | Refills: 0 | Status: SHIPPED | OUTPATIENT
Start: 2022-06-28 | End: 2022-07-28

## 2022-06-28 NOTE — TELEPHONE ENCOUNTER
----- Message from Bonnie Junior sent at 6/28/2022 11:29 AM EDT -----  Subject: Message to Provider    QUESTIONS  Information for Provider? Patient is calling and made a virtual visit   tomorrow for sinus issues and ear plugged. if he needs something different   call him back. set up appt [employer] tomorrow with Dr. Lena Ku. x two days   the symptoms have been present. no fever  ---------------------------------------------------------------------------  --------------  CALL BACK INFO  What is the best way for the office to contact you? OK to leave message on   voicemail  Preferred Call Back Phone Number? [phone]  ---------------------------------------------------------------------------  --------------  SCRIPT ANSWERS  Relationship to Patient?  Self

## 2022-06-29 ENCOUNTER — TELEMEDICINE (OUTPATIENT)
Dept: PRIMARY CARE CLINIC | Age: 62
End: 2022-06-29
Payer: COMMERCIAL

## 2022-06-29 DIAGNOSIS — J00 ACUTE RHINITIS: ICD-10-CM

## 2022-06-29 DIAGNOSIS — J06.9 ACUTE URI: Primary | ICD-10-CM

## 2022-06-29 PROCEDURE — 99213 OFFICE O/P EST LOW 20 MIN: CPT | Performed by: FAMILY MEDICINE

## 2022-06-29 RX ORDER — AMOXICILLIN 875 MG/1
875 TABLET, COATED ORAL 2 TIMES DAILY
Qty: 20 TABLET | Refills: 0 | Status: SHIPPED | OUTPATIENT
Start: 2022-06-29 | End: 2022-07-09

## 2022-06-29 ASSESSMENT — ENCOUNTER SYMPTOMS
CONSTIPATION: 0
COUGH: 1
RHINORRHEA: 0
BLOOD IN STOOL: 0
WHEEZING: 0
TROUBLE SWALLOWING: 0
SHORTNESS OF BREATH: 0
ABDOMINAL PAIN: 0
DIARRHEA: 0
VOICE CHANGE: 0

## 2022-06-29 ASSESSMENT — PATIENT HEALTH QUESTIONNAIRE - PHQ9
SUM OF ALL RESPONSES TO PHQ9 QUESTIONS 1 & 2: 0
1. LITTLE INTEREST OR PLEASURE IN DOING THINGS: 0
SUM OF ALL RESPONSES TO PHQ QUESTIONS 1-9: 0
2. FEELING DOWN, DEPRESSED OR HOPELESS: 0
SUM OF ALL RESPONSES TO PHQ QUESTIONS 1-9: 0

## 2022-06-29 NOTE — PROGRESS NOTES
2022    TELEHEALTH EVALUATION -- Audio/Visual (During WSPWB-16 public health emergency)    HPI:    Lázaro Nance (:  1960) has requested an audio/video evaluation for the following concern(s):    Patient requested this virtual visit due to upper respiratory symptoms. He reported that for the past few days complain of nasal congestion ear fullness as if the ears are plugged and seems there is some discharge. He denies fever and chills, denies postnasal drip or sore throat. Denies shortness of breath or wheezing, denies fever and chills. He has a COVID test done yesterday ( home kit) and it came back negative. He called the office yesterday and was prescribe Medrol Dosepak and Allegra. He also takes Nasonex nasal spray at home. Review of Systems   Constitutional: Negative for activity change. HENT: Positive for congestion and ear discharge. Negative for postnasal drip, rhinorrhea, trouble swallowing and voice change. Eyes: Negative for visual disturbance. Respiratory: Positive for cough. Negative for shortness of breath and wheezing. Cardiovascular: Negative for chest pain and leg swelling. Gastrointestinal: Negative for abdominal pain, blood in stool, constipation and diarrhea. Genitourinary: Negative for difficulty urinating, dysuria, frequency, hematuria and scrotal swelling. Musculoskeletal: Negative for arthralgias and myalgias. Skin: Negative for rash. Neurological: Negative for dizziness. Psychiatric/Behavioral: Negative for behavioral problems. Prior to Visit Medications    Medication Sig Taking? Authorizing Provider   amoxicillin (AMOXIL) 875 MG tablet Take 1 tablet by mouth 2 times daily for 10 days Yes Denise Petty MD   fexofenadine (ALLEGRA) 180 MG tablet Take 1 tablet by mouth daily Yes Denise Petty MD   methylPREDNISolone (MEDROL DOSEPACK) 4 MG tablet Use as directed on package.  Yes Denise Petty MD   fluticasone (FLONASE) 50 MCG/ACT nasal spray SPRAY 2 SPRAYS INTO EACH NOSTRIL EVERY DAY Yes Pete Mcdonald MD   atorvastatin (LIPITOR) 40 MG tablet Take 1 tablet by mouth daily Yes Marlyse Scheuermann, MD   sacubitril-valsartan (ENTRESTO)  MG per tablet Take 1 tablet by mouth 2 times daily Yes Austin Harrison MD   sildenafil (VIAGRA) 100 MG tablet Take 1 tablet by mouth daily as needed for Erectile Dysfunction Yes Pete Mcdonald MD   metoprolol succinate (TOPROL XL) 100 MG extended release tablet Take 1 tablet by mouth 2 times daily Yes Pete Mcdonald MD   aspirin 325 MG tablet Take 1 tablet by mouth daily Yes Pete Mcdonald MD       Social History     Tobacco Use    Smoking status: Never Smoker    Smokeless tobacco: Never Used   Vaping Use    Vaping Use: Never used   Substance Use Topics    Alcohol use: Yes     Alcohol/week: 6.0 standard drinks     Types: 6 Cans of beer per week     Comment: daily    Drug use: No        No Known Allergies,   Past Medical History:   Diagnosis Date    Allergic rhinitis     CAD (coronary artery disease)     saw in past 6 months and no issues    Cardiomyopathy ([address]     resolved?     Colon polyp     Glenohumeral arthritis 2/17/2014    HTN (hypertension)     Hyperlipidemia     IBS (irritable bowel syndrome)     Low back pain     Lumbar disc herniation     Rectal lesion     Rotator cuff tear, left     Rotator cuff tendonitis 2/17/2014    Shoulder pain 2/17/2014   ,   Past Surgical History:   Procedure Laterality Date    COLONOSCOPY N/A 5/19/2022    COLORECTAL CANCER SCREENING, NOT HIGH RISK performed by Sam Weinberg MD at [facility]        PHYSICAL EXAMINATION:  [ INSTRUCTIONS:  \"[x]\" Indicates a positive item  \"[]\" Indicates a negative item  -- DELETE ALL ITEMS NOT EXAMINED]  Vital Signs: (As obtained by patient/caregiver or practitioner observation)    Blood pressure-  Heart rate-    Respiratory rate-    Temperature-  Pulse oximetry- Constitutional: [x] Appears well-developed and well-nourished [] No apparent distress      [] Abnormal-   Mental status  [x] Alert and awake  [] Oriented to person/place/time []Able to follow commands      Eyes:  EOM    [x]  Normal  [] Abnormal-  Sclera  []  Normal  [] Abnormal -         Discharge []  None visible  [] Abnormal -    HENT:   [x] Normocephalic, atraumatic. [] Abnormal   [] Mouth/Throat: Mucous membranes are moist.     External Ears [x] Normal  [] Abnormal-     Neck: [x] No visualized mass     Pulmonary/Chest: [x] Respiratory effort normal.  [] No visualized signs of difficulty breathing or respiratory distress        [] Abnormal-      Musculoskeletal:   [x] Normal gait with no signs of ataxia         [] Normal range of motion of neck        [] Abnormal-       Neurological:        [x] No Facial Asymmetry (Cranial nerve 7 motor function) (limited exam to video visit)          [] No gaze palsy        [] Abnormal-         Skin:        [x] No significant exanthematous lesions or discoloration noted on facial skin         [] Abnormal-            Psychiatric:       [x] Normal Affect [] No Hallucinations        [] Abnormal-     Other pertinent observable physical exam findings-     ASSESSMENT/PLAN:  1. Acute URI  Continue Medrol dose pack and Allegra as ordered yesterday. Will add amoxicillin 875 mg twice daily. - amoxicillin (AMOXIL) 875 MG tablet; Take 1 tablet by mouth 2 times daily for 10 days  Dispense: 20 tablet; Refill: 0    2. Acute rhinitis  He was prescribed yesterday Allegra 180 daily and Medrol Dosepak. Continue Nasonex nasal spray      RTC PRN    Emiliano Rist, was evaluated through a synchronous (real-time) audio-video encounter. The patient (or guardian if applicable) is aware that this is a billable service, which includes applicable co-pays. This Virtual Visit was conducted with patient's (and/or legal guardian's) consent.  The visit was conducted pursuant to the emergency declaration under the [address] [facility] waiver authority and the [employer] and [employer] General Act. Patient identification was verified, and a caregiver was present when appropriate. The patient was located at Home: [address] [facility]. Provider was located at [facility] (Appt Dept): [address] [facility],  [address] [facility]. Total time spent on this encounter: Not billed by time    --Bonnie De La Cruz MD on 6/29/2022 at 6:46 PM    An electronic signature was used to authenticate this note.

## 2022-07-08 ENCOUNTER — OFFICE VISIT (OUTPATIENT)
Dept: PRIMARY CARE CLINIC | Age: 62
End: 2022-07-08
Payer: COMMERCIAL

## 2022-07-08 VITALS
DIASTOLIC BLOOD PRESSURE: 85 MMHG | WEIGHT: 207 LBS | BODY MASS INDEX: 29.7 KG/M2 | SYSTOLIC BLOOD PRESSURE: 151 MMHG | TEMPERATURE: 97.5 F | HEART RATE: 75 BPM | RESPIRATION RATE: 16 BRPM

## 2022-07-08 DIAGNOSIS — H65.22 LEFT CHRONIC SEROUS OTITIS MEDIA: ICD-10-CM

## 2022-07-08 DIAGNOSIS — J30.0 NONALLERGIC VASOMOTOR RHINITIS: ICD-10-CM

## 2022-07-08 DIAGNOSIS — H93.8X2 EAR FULLNESS, LEFT: ICD-10-CM

## 2022-07-08 PROCEDURE — 99214 OFFICE O/P EST MOD 30 MIN: CPT | Performed by: FAMILY MEDICINE

## 2022-07-08 RX ORDER — CEFDINIR 300 MG/1
300 CAPSULE ORAL 2 TIMES DAILY
Qty: 20 CAPSULE | Refills: 0 | Status: SHIPPED | OUTPATIENT
Start: 2022-07-08 | End: 2022-07-08 | Stop reason: SDUPTHER

## 2022-07-08 RX ORDER — CIPROFLOXACIN/HYDROCORTISONE 0.2 %-1 %
3 SUSPENSION, DROPS(FINAL DOSAGE FORM)(ML) OTIC (EAR) 2 TIMES DAILY
Qty: 1 EACH | Refills: 1 | Status: SHIPPED | OUTPATIENT
Start: 2022-07-08 | End: 2022-07-08 | Stop reason: SDUPTHER

## 2022-07-08 RX ORDER — CEFDINIR 300 MG/1
300 CAPSULE ORAL 2 TIMES DAILY
Qty: 20 CAPSULE | Refills: 0 | Status: SHIPPED | OUTPATIENT
Start: 2022-07-08 | End: 2022-07-18

## 2022-07-08 RX ORDER — CIPROFLOXACIN/HYDROCORTISONE 0.2 %-1 %
3 SUSPENSION, DROPS(FINAL DOSAGE FORM)(ML) OTIC (EAR) 2 TIMES DAILY
Qty: 1 EACH | Refills: 1 | Status: SHIPPED | OUTPATIENT
Start: 2022-07-08 | End: 2022-07-15

## 2022-07-08 ASSESSMENT — ENCOUNTER SYMPTOMS
RHINORRHEA: 1
SHORTNESS OF BREATH: 0
CONSTIPATION: 0
BLOOD IN STOOL: 0
DIARRHEA: 0
TROUBLE SWALLOWING: 0
ABDOMINAL PAIN: 0
VOICE CHANGE: 0

## 2022-07-12 ENCOUNTER — TELEPHONE (OUTPATIENT)
Dept: PRIMARY CARE CLINIC | Age: 62
End: 2022-07-12

## 2022-07-13 ENCOUNTER — OFFICE VISIT (OUTPATIENT)
Dept: ENT CLINIC | Age: 62
End: 2022-07-13
Payer: COMMERCIAL

## 2022-07-13 VITALS
RESPIRATION RATE: 16 BRPM | HEIGHT: 70 IN | DIASTOLIC BLOOD PRESSURE: 90 MMHG | BODY MASS INDEX: 29.63 KG/M2 | HEART RATE: 76 BPM | WEIGHT: 207 LBS | SYSTOLIC BLOOD PRESSURE: 143 MMHG

## 2022-07-13 DIAGNOSIS — J34.3 HYPERTROPHY OF BOTH INFERIOR NASAL TURBINATES: ICD-10-CM

## 2022-07-13 DIAGNOSIS — J34.2 DEVIATED NASAL SEPTUM: ICD-10-CM

## 2022-07-13 DIAGNOSIS — H61.22 IMPACTED CERUMEN OF LEFT EAR: ICD-10-CM

## 2022-07-13 DIAGNOSIS — H60.392 OTHER INFECTIVE ACUTE OTITIS EXTERNA OF LEFT EAR: ICD-10-CM

## 2022-07-13 DIAGNOSIS — R09.81 NASAL CONGESTION: ICD-10-CM

## 2022-07-13 DIAGNOSIS — J31.0 CHRONIC RHINITIS: ICD-10-CM

## 2022-07-13 DIAGNOSIS — J32.2 CHRONIC ETHMOIDAL SINUSITIS: Primary | ICD-10-CM

## 2022-07-13 DIAGNOSIS — H91.93 BILATERAL HEARING LOSS, UNSPECIFIED HEARING LOSS TYPE: ICD-10-CM

## 2022-07-13 PROCEDURE — 99204 OFFICE O/P NEW MOD 45 MIN: CPT | Performed by: STUDENT IN AN ORGANIZED HEALTH CARE EDUCATION/TRAINING PROGRAM

## 2022-07-13 PROCEDURE — 69210 REMOVE IMPACTED EAR WAX UNI: CPT | Performed by: STUDENT IN AN ORGANIZED HEALTH CARE EDUCATION/TRAINING PROGRAM

## 2022-07-13 PROCEDURE — 31231 NASAL ENDOSCOPY DX: CPT | Performed by: STUDENT IN AN ORGANIZED HEALTH CARE EDUCATION/TRAINING PROGRAM

## 2022-07-13 RX ORDER — IPRATROPIUM BROMIDE 42 UG/1
2 SPRAY, METERED NASAL 4 TIMES DAILY
Qty: 15 ML | Refills: 1 | Status: SHIPPED | OUTPATIENT
Start: 2022-07-13 | End: 2022-08-04

## 2022-07-13 NOTE — PROGRESS NOTES
[facility] JAMAL An (:  1960) is a 60 y.o. male, here for evaluation of the following chief complaint(s):  Otitis Media (left) and Cerumen Impaction (bilateral)      ASSESSMENT/PLAN:  1. Chronic ethmoidal sinusitis  2. Chronic rhinitis  3. Deviated nasal septum  4. Nasal congestion  5. Other infective acute otitis externa of left ear  6. Bilateral hearing loss, unspecified hearing loss type  7. Impacted cerumen of left ear  8. Hypertrophy of both inferior nasal turbinates      This is a very pleasant 60 y.o. male here today for evaluation of the the above-noted complaints. On exam, there is evidence of left-sided otitis externa impacted cerumen. This was debrided and boric acid was placed. I will start the patient on Cortisporin drops. The previous drops he was prescribed were not covered by insurance. The patient provides a history and has physical exam findings consistent with prior endoscopic sinus surgery for chronic sinusitis. He currently has a small fungal ball in the right ethmoid cavity. He also has a persistently deviated nasal septum and turbinate hypertrophy on exam.  I will start him on irrigations. Hopefully this will dislodge the fungal ball. If not we will perform debridement at follow-up. Patient also has a history consistent with chronic rhinitis, possibly vasomotor. Would like to start the patient on Atrovent. He can use this up to 4 times a day. Medical Decision Making: The following items were considered in medical decision making:  Independent review of images  Review / order clinical lab tests  Review / order radiology tests  Decision to obtain old records  Review and summation of old records as accessed through [facility] if applicable    SUBJECTIVE/OBJECTIVE:  KERRY Reyes is here today for evaluation of     Patient has a history of vasomotor rhinitis.   He also has associated chronic respiratory symptoms as a result of the chronic nasal drainage. He has been having issues related to severe nasal congestion, ear fullness and facial pain and pressure. No evidence of fevers. No purulent drainage. He was initially treated with a Medrol Dosepak and Allegra, Flonase with no improvement. He was subsequently prescribed amoxicillin. Previously on a Z-Edgardo with no improvement. Patient has a history of sinus surgery number years ago. He gets intermittent facial pain and pressure, nasal drainage which is constant. It is exacerbated by temperature changes and eating. His sense of smell is okay. He is not irrigate. He was using fluticasone in his nose. REVIEW OF SYSTEMS  The following systems were reviewed and revealed the following in addition to any already discussed in the HPI:    PHYSICAL EXAM    GENERAL: No acute distress, alert and oriented, no hoarseness, strong voice  EYES: EOMI, Anti-icteric  HENT:   Head: Normocephalic and atraumatic. Face:  Symmetric, facial nerve intact  Right Ear: Normal external ear, normal external auditory canal, intact tympanic membrane with normal mobility and aerated middle ear  Left Ear: Normal external ear, evidence of impacted cerumen mixed with infectious debris. Mouth/Oral Cavity:  normal lips, Uvula is midline, no mucosal lesions, no trismus, normal dentition, normal salivary quality/flow  Oropharynx/Larynx:  normal oropharynx, normal-appearing tonsils  Nose:Normal external nasal appearance. Anterior rhinoscopy shows  a deviated septum preventing view posteriorly. Mild swelling of turbinates.   Normal mucosa   NECK: Normal range of motion, no thyromegaly, trachea is midline, no lymphadenopathy, no neck masses, no crepitus  CHEST: Normal respiratory effort, no retractions, breathing comfortably  SKIN: No rashes, normal appearing skin, no evidence of skin lesions/tumors  Neuro:  cranial nerve II-XII intact; normal gait  Cardio: no edema        PROCEDURE    Use of Operating Microscope and Cerumen Removal CPT code 69210-left  Indications:  Left cerumen impaction obstructing visualization of the tympanic membrane(s). An operating microscope was utilized to visualize the external auditory canals using a speculum. The external auditory canals were occluded with cerumen on the. The cerumen and debris was removed with instrumentation including suction and currettes under microscopic evaluation. There is evidence of myringitis on the left. Boric acid was placed. Nasal Endoscopy (CPT code 29130)       Due to the patients chronic sinus disease and/or history of sinonasal neoplasm for surveillance a nasal endoscopy with or without debridement will be performed to complete a significant physical examination of the patient which cannot be performed by anterior rhinoscopy alone (failure of complete examination of the paranasal sinuses). Failure to provide this procedure may lead to late detection of significant chronic benign disease, acute exacerbation, resolution or failure of early diagnosis of recurrent cancer. The procedure report is present in the body of the chart. Verbal consent was received. After topical anesthesia and decongestion had been obtained using aerosolized 1% lidocaine and oxymetazoline, a 45 degree rigid endoscope was placed into both nares with the patient in a sitting position. The following was observed:      Septum: intact and deviated   Other:   -The inferior and middle turbinates were examined. The middle meatus, and sphenoethmoid recess was examined bilaterally. -Mild hypertrophy of turbinates. Bilateral endoscopic sinus surgery changes with max antrostomies, sphenoethmoidectomy's. There is a fungal ball in the right ethmoid cavity.  -There were no complications. Tolerated well without complication. I attest that I was present for and did the entire procedure myself.             This note was generated completely or in part utilizing Dragon dictation speech recognition software. Occasionally, words are mistranscribed and despite editing, the text may contain inaccuracies due to incorrect word recognition. If further clarification is needed please contact the office at [phone]. An electronic signature was used to authenticate this note.     --Coty Diggs MD

## 2022-07-15 NOTE — PROGRESS NOTES
Ramona Maki   1960, 60 y.o. male   [id number]       Referring Provider: Coty Diggs MD  Referral Type: In an order in [address] [place] [address]    Reason for Visit: Evaluation of suspected change in hearing, tinnitus, or balance. ADULT AUDIOLOGIC EVALUATION      Ramona Maki is a 60 y.o. male seen today, 7/19/2022 , for an initial audiologic evaluation. Patient was seen by Coty Diggs MD following today's evaluation. AUDIOLOGIC AND OTHER PERTINENT MEDICAL HISTORY:      Ramona Maki noted aural fullness and tinnitus. Patient reports a history of left otitis externa. He has been using Cortisporin drops since his last visit with Coty Diggs MD on 7/13/22 with reported improvement of symptoms. Patient notes bilateral tinntius that is intermittent in nature. He also notes a bilateral pressure sensation in the ears. Aquilino An denied otalgia, otorrhea, dizziness, imbalance, history of falls, history of significant noise exposure, history of head trauma, history of ear surgery, and family history of hearing loss. Date: 7/19/2022     IMPRESSIONS:        AD:Hearing WNL sloping to Mild SNHL, Excellent WRS, Type A tymp  AS:Hearing WNL sloping to Moderate SNHL, Excellent WRS, Type A tymp    Test results consistent with bilateral Sensorineural hearing loss. Hearing loss significant enough to create hearing difficulty in some listening situations. Discussed hearing loss, tinnitus, and hearing aids with patient. Patient to follow medical recommendations per Coty Diggs MD .    ASSESSMENT AND FINDINGS:     Otoscopy revealed: Clear ear canals bilaterally    RIGHT EAR:  Hearing Sensitivity: Normal hearing sensitivity to Mild   Sensorineural hearing loss  Speech Recognition Threshold: 10 dB HL  Word Recognition:Excellent (100%), based on NU-6 25-word list at 60m dBHL using recorded speech stimuli.     Tympanometry: Normal peak pressure and compliance, Type A tympanogram, consistent with normal middle ear function. Acoustic Reflexes: Ipsilateral: Could not maintain seal. Contralateral: Could not maintain seal.    LEFT EAR:  Hearing Sensitivity: Normal hearing sensitivity to Moderate   Sensorineural hearing loss  Speech Recognition Threshold: 15 dB HL  Word Recognition:Excellent (100%), based on NU-6 25-word list at 60m dBHL using recorded speech stimuli. Tympanometry: Normal peak pressure and compliance, Type A tympanogram, consistent with normal middle ear function. Acoustic Reflexes: Ipsilateral: Could not maintain seal. Contralateral: Could not maintain seal.    Reliability: Good  Transducer: HF Headphones    See scanned audiogram dated 7/19/2022  for results. PATIENT EDUCATION:     The following items were discussed with the patient:   - Good Communication Strategies  - Hearing Loss and Hearing Aids  - Tinnitus Management Strategies      Educational information was shared in the After Visit Summary. RECOMMENDATIONS:                                                                                                                                                                                                                                                          The following items are recommended based on patient report and results from today's appointment:   - Continue medical follow-up with Grey Edwards MD.   - Retest hearing as medically indicated and/or sooner if a change in hearing is noted. - If desired, schedule a Hearing Aid Evaluation (HAE) appointment to discuss hearing aid options. - Utilize \"Good Communication Strategies\" as discussed to assist in speech understanding with communication partners. - Maintain a sound enriched environment to assist in the management of tinnitus symptoms.        Boris Steiner  Audiologist    Chart CC'd to: Grey Edwards MD      Degree of   Hearing Sensitivity dB Range   Within Normal Limits (WNL) 0 - 20 Mild 20 - 40   Moderate 40 - 55   Moderately-Severe 55 - 70   Severe 70 - 90   Profound 90 +

## 2022-07-18 DIAGNOSIS — H91.90 HEARING LOSS, UNSPECIFIED HEARING LOSS TYPE, UNSPECIFIED LATERALITY: Primary | ICD-10-CM

## 2022-07-19 ENCOUNTER — PROCEDURE VISIT (OUTPATIENT)
Dept: AUDIOLOGY | Age: 62
End: 2022-07-19
Payer: COMMERCIAL

## 2022-07-19 ENCOUNTER — OFFICE VISIT (OUTPATIENT)
Dept: ENT CLINIC | Age: 62
End: 2022-07-19
Payer: COMMERCIAL

## 2022-07-19 VITALS
DIASTOLIC BLOOD PRESSURE: 84 MMHG | BODY MASS INDEX: 29.63 KG/M2 | SYSTOLIC BLOOD PRESSURE: 157 MMHG | HEIGHT: 70 IN | HEART RATE: 73 BPM | WEIGHT: 207 LBS

## 2022-07-19 DIAGNOSIS — J34.2 DEVIATED NASAL SEPTUM: ICD-10-CM

## 2022-07-19 DIAGNOSIS — H90.3 SENSORINEURAL HEARING LOSS (SNHL) OF BOTH EARS: Primary | ICD-10-CM

## 2022-07-19 DIAGNOSIS — J31.0 CHRONIC RHINITIS: ICD-10-CM

## 2022-07-19 DIAGNOSIS — J32.2 CHRONIC ETHMOIDAL SINUSITIS: ICD-10-CM

## 2022-07-19 DIAGNOSIS — H93.13 TINNITUS OF BOTH EARS: ICD-10-CM

## 2022-07-19 DIAGNOSIS — R09.81 NASAL CONGESTION: ICD-10-CM

## 2022-07-19 DIAGNOSIS — J34.3 HYPERTROPHY OF BOTH INFERIOR NASAL TURBINATES: ICD-10-CM

## 2022-07-19 PROCEDURE — 31231 NASAL ENDOSCOPY DX: CPT | Performed by: STUDENT IN AN ORGANIZED HEALTH CARE EDUCATION/TRAINING PROGRAM

## 2022-07-19 PROCEDURE — 92567 TYMPANOMETRY: CPT | Performed by: AUDIOLOGIST

## 2022-07-19 PROCEDURE — 99213 OFFICE O/P EST LOW 20 MIN: CPT | Performed by: STUDENT IN AN ORGANIZED HEALTH CARE EDUCATION/TRAINING PROGRAM

## 2022-07-19 PROCEDURE — 92557 COMPREHENSIVE HEARING TEST: CPT | Performed by: AUDIOLOGIST

## 2022-07-19 NOTE — PROGRESS NOTES
Dosepak and Allegra, Flonase with no improvement. He was subsequently prescribed amoxicillin. Previously on a Z-Edgardo with no improvement. Patient has a history of sinus surgery number years ago. He gets intermittent facial pain and pressure, nasal drainage which is constant. It is exacerbated by temperature changes and eating. His sense of smell is okay. He is not irrigate. He was using fluticasone in his nose. Update 7/19/2022:    Patient presents today for follow-up regarding issues related to his ears and nose. The patient states that he has been irrigating and using his steroids as prescribed. He tried the Pratropium but felt like it stopped him up and he did not like it. He will also use the eardrops in the left ear and felt like this improved his ear symptoms. He denies any drainage. REVIEW OF SYSTEMS  The following systems were reviewed and revealed the following in addition to any already discussed in the HPI:    PHYSICAL EXAM    GENERAL: No acute distress, alert and oriented, no hoarseness, strong voice  EYES: EOMI, Anti-icteric  HENT:   Head: Normocephalic and atraumatic. Face:  Symmetric, facial nerve intact  Right Ear: Normal external ear, normal external auditory canal, intact tympanic membrane with normal mobility and aerated middle ear  Left Ear: Normal external ear, resolution of his otitis externa on the left. Small amount of residual boric acid. Tympanic membrane is normal.  Mouth/Oral Cavity:  normal lips, Uvula is midline, no mucosal lesions, no trismus, normal dentition, normal salivary quality/flow  Oropharynx/Larynx:  normal oropharynx, normal-appearing tonsils  Nose:Normal external nasal appearance. Anterior rhinoscopy shows  a deviated septum preventing view posteriorly. Mild swelling of turbinates.   Normal mucosa   NECK: Normal range of motion, no thyromegaly, trachea is midline, no lymphadenopathy, no neck masses, no crepitus  CHEST: Normal respiratory effort, no retractions, breathing comfortably  SKIN: No rashes, normal appearing skin, no evidence of skin lesions/tumors  Neuro:  cranial nerve II-XII intact; normal gait  Cardio:  no edema        PROCEDURE        Nasal Endoscopy (CPT code 67958)       Due to the patients chronic sinus disease and/or history of sinonasal neoplasm for surveillance a nasal endoscopy with or without debridement will be performed to complete a significant physical examination of the patient which cannot be performed by anterior rhinoscopy alone (failure of complete examination of the paranasal sinuses). Failure to provide this procedure may lead to late detection of significant chronic benign disease, acute exacerbation, resolution or failure of early diagnosis of recurrent cancer. The procedure report is present in the body of the chart. Verbal consent was received. After topical anesthesia and decongestion had been obtained using aerosolized 1% lidocaine and oxymetazoline, a 45 degree rigid endoscope was placed into both nares with the patient in a sitting position. The following was observed:      Septum: intact and deviated   Other:   -The inferior and middle turbinates were examined. The middle meatus, and sphenoethmoid recess was examined bilaterally. -Mild hypertrophy of turbinates. Bilateral endoscopic sinus surgery changes with max antrostomies, sphenoethmoidectomy's. The fungal ball in the right ethmoid cavity is no longer present. There is scarring between the middle turbinate and the remnant uncinate on the left. There is some thick mucus draining bilaterally  -There were no complications. Tolerated well without complication. I attest that I was present for and did the entire procedure myself. This note was generated completely or in part utilizing Dragon dictation speech recognition software.   Occasionally, words are mistranscribed and despite editing, the text may contain inaccuracies due to incorrect word recognition. If further clarification is needed please contact the office at [phone]. An electronic signature was used to authenticate this note.     --Patricio Gandhi MD

## 2022-07-19 NOTE — Clinical Note
Dr. Star Padilla,  Please see note from this patient's audiogram from today. Please let me know if there is anything further you need.     Boris Steiner Audiologist

## 2022-07-19 NOTE — PATIENT INSTRUCTIONS
If you are interested in pursuing hearing aids, here are the next steps:    Contact your insurance and ask, Do I have a benefit for hearing aids?   If the answer is no hearing aids will be a 100% out of pocket expense  If the answer is yes, ask Can I use this benefit at [facility] ([facility])?  or Where can I use this benefit?  If [address] [place] [address] is not in-network for hearing aids, your insurance should be able to provide the appropriate contact information for an in-network provider. Call [facility] ([phone]) to schedule a Hearing Aid Evaluation   This appointment is when we will discuss hearing aid options and decide which device is most appropriate for you. Learning About Hearing Aids  What is a hearing aid? A hearing aid makes sounds louder. It can help some people with hearing problems to hear better. Hearing aids do not restore normal hearing. But they can make it easier to communicate by making sounds clearer. Digital programmable hearing aids can adjust themselves to work best where you are at any time. You also have more choices in setting them up than with analog hearing aids. There are also different styles of hearing aids. A behind-the-ear (BTE) hearing aid connects to a plastic ear mold that fits inside the outer ear. BTE hearing aids are used for all levels of hearing loss, especially very severe hearing loss. They may be better for children for safety and growth reasons. Poorly fitting BTE ear molds or a buildup of earwax may cause a whistling sound (feedback). An in-the-ear (ITE) hearing aid fits in the outer part of the ear. It can be used by people with mild to severe hearing loss. ITE hearing aids can be used with other hearing devices, such as a telecoil that improves hearing during phone calls. ITE hearing aids can be damaged by earwax and fluid draining from the ear. Their small size may be hard for some people to handle.  They are not often used in children because the case must be replaced as the child grows. An in-the-canal (ITC) hearing aid fits into the ear canal. ITC hearing aids are used by people with mild to moderate hearing loss. They are made to fit the shape and the size of your ear canal. They can be damaged by earwax and fluid draining from the ear. Their small size may be hard for some people to handle. They are not made for children. You have some options if you have a hearing problem and are thinking about getting hearing aids. You can go to your doctor or an audiologist. He or she will do a hearing test and help you decide which type and style of hearing aid may be best for you. What else should I know about hearing aids? Find out if your insurance covers hearing aids. They can be expensive. Different types of hearing aids come with different costs. Also find out about a warranty or return policy in case you are not happy with your hearing aids. Follow-up care is a key part of your treatment and safety. Be sure to make and go to all appointments, and call your doctor if you are having problems. It's also a good idea to know your test results and keep a list of the medicines you take. Where can you learn more? Go to [email or web address]://[email or web address].[email or web address] org and sign in to your TapFit account. Enter G612 in the MetalCompass box to learn more about \"Learning About Hearing Aids. \"     If you do not have an account, please click on the \"Sign Up Now\" link. Current as of: October 21, 2018  Content Version: 12.1  © 4930-4951 [employer]. Care instructions adapted under license by [facility] ([facility]). If you have questions about a medical condition or this instruction, always ask your healthcare professional. [place] [address] any warranty or liability for your use of this information. Hearing Loss: Care Instructions  Your Care Instructions      Hearing loss is a sudden or slow decrease in how well you hear.  It can range from mild to profound. Permanent hearing loss can occur with aging, and it can happen when you are exposed long-term to loud noise. Examples include listening to loud music, riding motorcycles, or being around other loud machines. Hearing loss can affect your work and home life. It can make you feel lonely or depressed. You may feel that you have lost your independence. But hearing aids and other devices can help you hear better and feel connected to others. Follow-up care is a key part of your treatment and safety. Be sure to make and go to all appointments, and call your doctor if you are having problems. It's also a good idea to know your test results and keep a list of the medicines you take. How can you care for yourself at home? Avoid loud noises whenever possible. This helps keep your hearing from getting worse. Always wear hearing protection around loud noises. If appropriate, wear hearing aid(s) as directed. It is recommended that hearing aids are worn during all waking hours to keep your brain active and give it access to the sounds it is missing. If you are beginning your process with hearing aid(s), schedule a \"Hearing Aid Evaluation\" with an audiologist to discuss your lifestyle, features of hearing aid technology, and styles of hearing aids available. It is recommended that you contact your insurance company to determine if you have a hearing aid benefit, as this may dictate who you can see for these services. Have hearing tests as your doctor suggests. They can show whether your hearing has changed. Your hearing aid may need to be adjusted. Use other assistive devices as needed. These may include:  Telephone amplifiers and hearing aids that can connect to a television, stereo, radio, or microphone. Devices that use lights or vibrations. These alert you to the doorbell, a ringing telephone, or a baby monitor. Television closed-captioning.  This shows the words at the bottom of the screen. Most new TVs can do this. TTY (text telephone). This lets you type messages back and forth on the telephone instead of talking or listening. These devices are also called TDD. When messages are typed on the keyboard, they are sent over the phone line to a receiving TTY. The message is shown on a monitor. Use pagers, fax machines, text, and email if it is hard for you to communicate by telephone. Try to learn a listening technique called speech-reading. It is not lip-reading. You pay attention to people's gestures, expressions, posture, and tone of voice. These clues can help you understand what a person is saying. Face the person you are talking to, and have him or her face you. Make sure the lighting is good. You need to see the other person's face clearly. Think about counseling if you need help to adjust to your hearing loss. When should you call for help? Watch closely for changes in your health, and be sure to contact your doctor if:    You think your hearing is getting worse. You have new symptoms, such as dizziness or nausea. Tinnitus: Overview and Management Strategies          Many people have some ringing sounds in their ears once in a while. You may hear a roar, a hiss, a tinkle, or a buzz. The sound usually lasts only a few minutes. If it goes on all the time, you may have tinnitus. Tinnitus is usually caused by long-term exposure to loud noise. This damages the nerves in the inner ear. It can occur with all types of hearing loss. It may be a symptom of almost any ear problem. Tinnitus may be caused by a buildup of earwax. Or, it may be caused by ear infections or certain medicines (especially antibiotics or large amounts of aspirin). You can also hear noises in your ears because of an injury to the ears, drinking too much alcohol or caffeine, or a medical condition.   Other conditions may also contribute to tinnitus, including: head and neck trauma, temporomandibular joint disorder (TMJ), sinus pressure and barometric trauma, traumatic brain injury, metabolic disorders, autoimmune disorders, stress, and high blood pressure. You may need tests to evaluate your hearing and to find causes of long-lasting tinnitus. Your doctor may suggest one or more treatments to help you cope with the tinnitus. You can also do things at home to help reduce symptoms. Causes of Tinnitus  We do not know the exact cause of tinnitus. One thing we do know is that you are not imagining it. If you have tinnitus, chances are the cause will remain a mystery. Conditions that might cause tinnitus include the following:   Hearing loss   Ménière's disease   Loud noise exposure   Migraines   Head injury   Drugs or medicines that are toxic to hearing   Anemia   High blood pressure   Stress   A lot of wax in the ear   Certain types of tumors   Having a lot of caffeine   Smoking cigarettes  You may find that your tinnitus is worse at night. This happens because it is quiet and you are not distracted. Feeling tired and stressed may make your tinnitus worse. Follow-up care is a key part of your treatment and safety. Be sure to make and go to all appointments, and call your doctor if you are having problems. It's also a good idea to know your test results and keep a list of the medicines you take. How can you care for yourself at home? Limit or cut out alcohol, caffeine, and sodium. They can make your symptoms worse. Do not smoke or use other tobacco products. Nicotine reduces blood flow to the ear and makes tinnitus worse. If you need help quitting, talk to your doctor about stop-smoking programs and medicines. These can increase your chances of quitting for good. Talk to your doctor about whether to stop taking aspirin and similar products such as ibuprofen or naproxen. Get exercise often. It can help improve blood flow to the ear.     Hearing Aids and Other Devices  A hearing aid may help your tinnitus if you have a hearing loss. An audiologist can help you find and use the best hearing aid for you. Tinnitus maskers look like hearing aids. They make a sound that masks, or covers up, the tinnitus. The masking sound distracts you from the ringing in your ears. You may be able to use a masker and a hearing aid at the same time. Sound machines can be useful at night or during quiet times. There are machines you can buy at the store. Or, you can find apps on your phone that make sounds, like the ocean or rainfall. Fish tanks, fans, quiet music, and indoor waterfalls can help, as well. Ways to manage/cope with tinnitus  Some tinnitus may last a long time. To manage your tinnitus, try to: Avoid noises that you think caused your tinnitus. If you can't avoid loud noises, wear earplugs or earmuffs. Ignore the sound by paying attention to other things. Keeping your brain busy with other tasks or background noise can help your brain not focus on the tinnitus. Try to not give the tinnitus an emotional reaction. Do your best to ignore the sound and not let it bother you. Relax using biofeedback, meditation, or yoga. Feeling stressed and being tired can make tinnitus worse. Play music or white noise to help you sleep. Background noise may cover up the noise that you hear in your ears. You can buy a tabletop machine or a device that sits under your pillow to play soothing sounds, like ocean waves. Smart phones have free apps, such as Whist, Relax Melodies, ReSound Relief, and [employer]. These apps have different types of sounds/noise, some of which you can blend together to find sounds that are most soothing to you. Hearing aid technology, especially when there is some hearing loss, may help reduce tinnitus symptoms by giving your brain better access to the sounds it is missing.   There are some hearing aids with built-in noise generator programs, which may help when amplification alone is not enough. Additional resources may be found through the [employer] Tinnitus Association at www.yin.org    When should you call for help? Call 911 anytime you think you may need emergency care. For example, call if:    You have symptoms of a stroke. These may include:  Sudden numbness, tingling, weakness, or loss of movement in your face, arm, or leg, especially on only one side of your body. Sudden vision changes. Sudden trouble speaking. Sudden confusion or trouble understanding simple statements. Sudden problems with walking or balance. A sudden, severe headache that is different from past headaches. Call your doctor now or seek immediate medical care if:    You develop other symptoms. These may include hearing loss (or worse hearing loss), balance problems, dizziness, nausea, or vomiting. Watch closely for changes in your health, and be sure to contact your doctor if:    Your tinnitus moves from both ears to one ear. Your hearing loss gets worse within 1 day after an ear injury. Your tinnitus or hearing loss does not get better within 1 week after an ear injury. Your tinnitus bothers you enough that you want to take medicines to help you cope with it. If you notice changes in your tinnitus and/or your hearing, it is recommended that you have your hearing tested by your audiologist and to follow-up with your physician that manages your hearing loss (such as your ENT or Primary Care doctor). Good Communication Strategies    Communication can be challenging for anyone, but can be especially difficult for those with some degree of hearing loss. While we may not be able to control every factor that may lead to difficulty with communication, there are Good Communication Strategies that we can all use in our day-to-day lives. Communication takes both parties working together for it to be successful. Tips as a Listener:   Control your environment.   It is important to limit the amount of background noise in the room when possible. You should also consider having a good light source in the room to best see the other person. Ask for clarification. Instead of saying \"What?\", you can use parts of what you heard to make a new question. For example, if you heard the word \"Thursday\" but not the rest of the week, you may ask \"What was that about Thursday? \" or \"What did you want to do Thursday? \". This shows the person talking that you are listening and will help them better explain what they are saying. Be an advocate for yourself. If you are hearing but not understanding, tell the other person \"I can hear you, but I need you to slow down when you speak. \"  Or if someone is facing the other direction, say \"I cannot hear you when you are not looking at me when we talk. \"       Tips as a Talker:   - Sit or stand 3 to 6 feet away to maximize audibility         -- It is unrealistic to believe someone else will fully hear your message if you are speaking from across the room or in a different room in the house   - Stay at eye level to help with visual cues   - Make sure you have the persons attention before speaking   - Use facial expressions and gestures to accentuate your message   - Raise your voice slightly (do not scream)   - Speak slowly and distinctly   - Use short, simple sentences   - Rephrase your words if the person is having a hard time understanding you    - To avoid distortion, dont speak directly into a persons ear      Some additional items that may be helpful:   - Remain patient - this is important for both parties   - Write down items that still cannot be heard/understood. You may write with pen/paper or consider typing/texting on a cell phone or smart device. - If background noise is unavoidable, try to keep yourself in a good position in the room.   By sitting at a [place] on the side of the restaurant (preferably a corner), it will be easier to communicate than if you were sitting at a table in the middle with background noise surrounding you. Keep yourself positioned away from music speakers or heavy foot traffic.

## 2022-08-03 ENCOUNTER — OFFICE VISIT (OUTPATIENT)
Dept: ENT CLINIC | Age: 62
End: 2022-08-03
Payer: COMMERCIAL

## 2022-08-03 VITALS
SYSTOLIC BLOOD PRESSURE: 154 MMHG | DIASTOLIC BLOOD PRESSURE: 90 MMHG | BODY MASS INDEX: 30.06 KG/M2 | HEART RATE: 70 BPM | WEIGHT: 210 LBS | HEIGHT: 70 IN

## 2022-08-03 DIAGNOSIS — H61.22 IMPACTED CERUMEN OF LEFT EAR: ICD-10-CM

## 2022-08-03 DIAGNOSIS — H60.332 ACUTE SWIMMER'S EAR OF LEFT SIDE: ICD-10-CM

## 2022-08-03 DIAGNOSIS — J32.2 CHRONIC ETHMOIDAL SINUSITIS: Primary | ICD-10-CM

## 2022-08-03 DIAGNOSIS — J34.3 HYPERTROPHY OF BOTH INFERIOR NASAL TURBINATES: ICD-10-CM

## 2022-08-03 DIAGNOSIS — R09.81 NASAL CONGESTION: ICD-10-CM

## 2022-08-03 DIAGNOSIS — J31.0 CHRONIC RHINITIS: ICD-10-CM

## 2022-08-03 DIAGNOSIS — J34.2 DEVIATED NASAL SEPTUM: ICD-10-CM

## 2022-08-03 PROCEDURE — 99214 OFFICE O/P EST MOD 30 MIN: CPT | Performed by: STUDENT IN AN ORGANIZED HEALTH CARE EDUCATION/TRAINING PROGRAM

## 2022-08-03 PROCEDURE — 69210 REMOVE IMPACTED EAR WAX UNI: CPT | Performed by: STUDENT IN AN ORGANIZED HEALTH CARE EDUCATION/TRAINING PROGRAM

## 2022-08-03 RX ORDER — HYDROCORTISONE AND ACETIC ACID 20.75; 10.375 MG/ML; MG/ML
SOLUTION AURICULAR (OTIC)
Qty: 1 EACH | Refills: 1 | Status: SHIPPED | OUTPATIENT
Start: 2022-08-03 | End: 2022-09-21

## 2022-08-03 NOTE — PROGRESS NOTES
[facility] JAMAL An (:  1960) is a 60 y.o. male, here for evaluation of the following chief complaint(s):  Ear Problem (Both feel clogged since Monday )      ASSESSMENT/PLAN:  1. Chronic ethmoidal sinusitis  2. Chronic rhinitis  3. Deviated nasal septum  4. Nasal congestion  5. Hypertrophy of both inferior nasal turbinates  6. Acute swimmer's ear of left side  7. Impacted cerumen of left ear      This is a very pleasant 60 y.o. male here today for evaluation of the the above-noted complaints. His audiogram shows evidence of bilateral normal sloping to moderate sensorineural hearing loss on the left and normal sloping to mild sensorineural hearing loss on the right. Today, had a fungal otitis externa of the left ear. Previously, there was no fungal component. Stop Ciprodex and start VoSol. The patient provides a history and has physical exam findings consistent with prior endoscopic sinus surgery for chronic sinusitis. The small fungal ball in the right ethmoid cavity has been removed with irrigations. He still has evidence of extensive scarring and some thick mucus stranding around the maxillary ostium's. He trialed Atrovent for his chronic rhinitis but did not tolerate it. We will stop this today. Continue Fluticasone. Follow-up in 6 months to recheck the nose and ears. Sooner if he develops any change in his symptoms. Medical Decision Making: The following items were considered in medical decision making:  Independent review of images  Review / order clinical lab tests  Review / order radiology tests  Decision to obtain old records  Review and summation of old records as accessed through [facility] if applicable    SUBJECTIVE/OBJECTIVE:  KERRY Bass is here today for evaluation of     Patient has a history of vasomotor rhinitis.   He also has associated chronic respiratory symptoms as a result of the chronic nasal drainage. He has been having issues related to severe nasal congestion, ear fullness and facial pain and pressure. No evidence of fevers. No purulent drainage. He was initially treated with a Medrol Dosepak and Allegra, Flonase with no improvement. He was subsequently prescribed amoxicillin. Previously on a Z-Edgardo with no improvement. Patient has a history of sinus surgery number years ago. He gets intermittent facial pain and pressure, nasal drainage which is constant. It is exacerbated by temperature changes and eating. His sense of smell is okay. He is not irrigate. He was using fluticasone in his nose. Update 7/19/2022:    Patient presents today for follow-up regarding issues related to his ears and nose. The patient states that he has been irrigating and using his steroids as prescribed. He tried the Pratropium but felt like it stopped him up and he did not like it. He will also use the eardrops in the left ear and felt like this improved his ear symptoms. He denies any drainage. Update 8/3/2022:    Patient presents today with concern for new issues related to a clogged ear. States that his hearing is diminished and he has had pain and drainage. He started the ciprofloxacin drops per    REVIEW OF SYSTEMS  The following systems were reviewed and revealed the following in addition to any already discussed in the HPI:    PHYSICAL EXAM    GENERAL: No acute distress, alert and oriented, no hoarseness, strong voice  EYES: EOMI, Anti-icteric  HENT:   Head: Normocephalic and atraumatic. Face:  Symmetric, facial nerve intact        PROCEDURE    Use of Operating Microscope and Cerumen Removal CPT code 69210-left  Indications: Left cerumen impaction obstructing visualization of the tympanic membrane(s). An operating microscope was utilized to visualize the external auditory canals using a speculum.  The external auditory canals were occluded with cerumen and fungal debris on the left. The cerumen and debris was removed with instrumentation including suction and currettes under microscopic evaluation. Nasal Endoscopy (CPT code 01886) from prior visit       Due to the patients chronic sinus disease and/or history of sinonasal neoplasm for surveillance a nasal endoscopy with or without debridement will be performed to complete a significant physical examination of the patient which cannot be performed by anterior rhinoscopy alone (failure of complete examination of the paranasal sinuses). Failure to provide this procedure may lead to late detection of significant chronic benign disease, acute exacerbation, resolution or failure of early diagnosis of recurrent cancer. The procedure report is present in the body of the chart. Verbal consent was received. After topical anesthesia and decongestion had been obtained using aerosolized 1% lidocaine and oxymetazoline, a 45 degree rigid endoscope was placed into both nares with the patient in a sitting position. The following was observed:      Septum: intact and deviated   Other:   -The inferior and middle turbinates were examined. The middle meatus, and sphenoethmoid recess was examined bilaterally. -Mild hypertrophy of turbinates. Bilateral endoscopic sinus surgery changes with max antrostomies, sphenoethmoidectomy's. The fungal ball in the right ethmoid cavity is no longer present. There is scarring between the middle turbinate and the remnant uncinate on the left. There is some thick mucus draining bilaterally  -There were no complications. Tolerated well without complication. I attest that I was present for and did the entire procedure myself. This note was generated completely or in part utilizing Dragon dictation speech recognition software. Occasionally, words are mistranscribed and despite editing, the text may contain inaccuracies due to incorrect word recognition.   If further

## 2022-08-04 RX ORDER — IPRATROPIUM BROMIDE 42 UG/1
2 SPRAY, METERED NASAL 4 TIMES DAILY
Qty: 15 ML | Refills: 1 | Status: SHIPPED | OUTPATIENT
Start: 2022-08-04 | End: 2022-08-15 | Stop reason: SDUPTHER

## 2022-08-11 ENCOUNTER — OFFICE VISIT (OUTPATIENT)
Dept: ENT CLINIC | Age: 62
End: 2022-08-11
Payer: COMMERCIAL

## 2022-08-11 VITALS
HEIGHT: 70 IN | SYSTOLIC BLOOD PRESSURE: 151 MMHG | DIASTOLIC BLOOD PRESSURE: 94 MMHG | RESPIRATION RATE: 16 BRPM | HEART RATE: 72 BPM | BODY MASS INDEX: 30.06 KG/M2 | WEIGHT: 210 LBS

## 2022-08-11 DIAGNOSIS — J32.2 CHRONIC ETHMOIDAL SINUSITIS: Primary | ICD-10-CM

## 2022-08-11 DIAGNOSIS — J31.0 CHRONIC RHINITIS: ICD-10-CM

## 2022-08-11 DIAGNOSIS — H60.331 ACUTE SWIMMER'S EAR OF RIGHT SIDE: ICD-10-CM

## 2022-08-11 DIAGNOSIS — H61.21 IMPACTED CERUMEN OF RIGHT EAR: ICD-10-CM

## 2022-08-11 PROCEDURE — 99214 OFFICE O/P EST MOD 30 MIN: CPT | Performed by: STUDENT IN AN ORGANIZED HEALTH CARE EDUCATION/TRAINING PROGRAM

## 2022-08-11 PROCEDURE — 69210 REMOVE IMPACTED EAR WAX UNI: CPT | Performed by: STUDENT IN AN ORGANIZED HEALTH CARE EDUCATION/TRAINING PROGRAM

## 2022-08-11 NOTE — PROGRESS NOTES
[address] [facility] JAMAL An (:  1960) is a 60 y.o. male, here for evaluation of the following chief complaint(s):  Cerumen Impaction (Right ear)      ASSESSMENT/PLAN:  1. Chronic ethmoidal sinusitis  2. Chronic rhinitis  3. Acute swimmer's ear of right side  4. Impacted cerumen of right ear      This is a very pleasant 60 y.o. male here today for evaluation of the the above-noted complaints. He now has evidence of impacted cerumen and an infectious otitis externa on the right. I will start him on Ciprodex drops for 1 week. If this does not improve, I have asked him to transition to the VoSol drops. I will see him back in several weeks to recheck his ears or sooner if he develops any new issues. His prior audiogram shows evidence of bilateral normal sloping to moderate sensorineural hearing loss on the left and normal sloping to mild sensorineural hearing loss on the right. The patient provides a history and has physical exam findings consistent with prior endoscopic sinus surgery for chronic sinusitis. The small fungal ball in the right ethmoid cavity has been removed with irrigations. We are observing this for now. He trialed Atrovent for his chronic rhinitis but did not tolerate it. Have asked him to continue his fluticasone. Medical Decision Making: The following items were considered in medical decision making:  Independent review of images  Review / order clinical lab tests  Review / order radiology tests  Decision to obtain old records  Review and summation of old records as accessed through [facility] if applicable    SUBJECTIVE/OBJECTIVE:  KERRY Gonzalez is here today for evaluation of     Patient has a history of vasomotor rhinitis. He also has associated chronic respiratory symptoms as a result of the chronic nasal drainage.   He has been having issues related to severe nasal congestion, ear fullness and facial pain and pressure. No evidence of fevers. No purulent drainage. He was initially treated with a Medrol Dosepak and Allegra, Flonase with no improvement. He was subsequently prescribed amoxicillin. Previously on a Z-Edgardo with no improvement. Patient has a history of sinus surgery number years ago. He gets intermittent facial pain and pressure, nasal drainage which is constant. It is exacerbated by temperature changes and eating. His sense of smell is okay. He is not irrigate. He was using fluticasone in his nose. Update 7/19/2022:    Patient presents today for follow-up regarding issues related to his ears and nose. The patient states that he has been irrigating and using his steroids as prescribed. He tried the Pratropium but felt like it stopped him up and he did not like it. He will also use the eardrops in the left ear and felt like this improved his ear symptoms. He denies any drainage. Update 8/3/2022:    Patient presents today with concern for new issues related to a clogged ear. States that his hearing is diminished and he has had pain and drainage. He started the ciprofloxacin drops per. Update 8/11/2022:    Patient presents today with concerns of ear fullness and decreased hearing and drainage from the right ear. This is been going on for several days. He does not think he got water in his ears. REVIEW OF SYSTEMS  The following systems were reviewed and revealed the following in addition to any already discussed in the HPI:    PHYSICAL EXAM    GENERAL: No acute distress, alert and oriented, no hoarseness, strong voice  EYES: EOMI, Anti-icteric  HENT:   Head: Normocephalic and atraumatic. Face:  Symmetric, facial nerve intact        PROCEDURE    Use of Operating Microscope and Cerumen Removal CPT code 69210-right  Indications: Right cerumen impaction obstructing visualization of the tympanic membrane(s).       An operating microscope was utilized to visualize the external auditory canals using a speculum. There is evidence of impacted cerumen mixed with debris in the right external auditory canal.  This was removed showing underlying myringitis and inflammation of the external auditory canal.  Boric acid was placed. The left external auditory canal and tympanic membrane were normal in appearance. Nasal Endoscopy (CPT code 93017) from prior visit       Due to the patients chronic sinus disease and/or history of sinonasal neoplasm for surveillance a nasal endoscopy with or without debridement will be performed to complete a significant physical examination of the patient which cannot be performed by anterior rhinoscopy alone (failure of complete examination of the paranasal sinuses). Failure to provide this procedure may lead to late detection of significant chronic benign disease, acute exacerbation, resolution or failure of early diagnosis of recurrent cancer. The procedure report is present in the body of the chart. Verbal consent was received. After topical anesthesia and decongestion had been obtained using aerosolized 1% lidocaine and oxymetazoline, a 45 degree rigid endoscope was placed into both nares with the patient in a sitting position. The following was observed:      Septum: intact and deviated   Other:   -The inferior and middle turbinates were examined. The middle meatus, and sphenoethmoid recess was examined bilaterally. -Mild hypertrophy of turbinates. Bilateral endoscopic sinus surgery changes with max antrostomies, sphenoethmoidectomy's. The fungal ball in the right ethmoid cavity is no longer present. There is scarring between the middle turbinate and the remnant uncinate on the left. There is some thick mucus draining bilaterally  -There were no complications. Tolerated well without complication. I attest that I was present for and did the entire procedure myself.             This note was generated completely or in part utilizing Dragon dictation speech recognition software. Occasionally, words are mistranscribed and despite editing, the text may contain inaccuracies due to incorrect word recognition. If further clarification is needed please contact the office at [phone]. An electronic signature was used to authenticate this note.     --Doc Noriega MD

## 2022-08-15 RX ORDER — IPRATROPIUM BROMIDE 42 UG/1
2 SPRAY, METERED NASAL 4 TIMES DAILY
Qty: 72 EACH | Refills: 0 | Status: SHIPPED | OUTPATIENT
Start: 2022-08-15 | End: 2022-08-15 | Stop reason: SINTOL

## 2022-09-06 ENCOUNTER — OFFICE VISIT (OUTPATIENT)
Dept: ENT CLINIC | Age: 62
End: 2022-09-06
Payer: COMMERCIAL

## 2022-09-06 VITALS
HEART RATE: 63 BPM | WEIGHT: 219 LBS | SYSTOLIC BLOOD PRESSURE: 158 MMHG | RESPIRATION RATE: 16 BRPM | DIASTOLIC BLOOD PRESSURE: 95 MMHG | HEIGHT: 70 IN | BODY MASS INDEX: 31.35 KG/M2

## 2022-09-06 DIAGNOSIS — H60.393 OTHER INFECTIVE ACUTE OTITIS EXTERNA OF BOTH EARS: ICD-10-CM

## 2022-09-06 DIAGNOSIS — H61.21 IMPACTED CERUMEN OF RIGHT EAR: ICD-10-CM

## 2022-09-06 DIAGNOSIS — J31.0 CHRONIC RHINITIS: ICD-10-CM

## 2022-09-06 DIAGNOSIS — R09.81 NASAL CONGESTION: ICD-10-CM

## 2022-09-06 DIAGNOSIS — J32.2 CHRONIC ETHMOIDAL SINUSITIS: ICD-10-CM

## 2022-09-06 DIAGNOSIS — H60.331 ACUTE SWIMMER'S EAR OF RIGHT SIDE: Primary | ICD-10-CM

## 2022-09-06 PROCEDURE — 99214 OFFICE O/P EST MOD 30 MIN: CPT | Performed by: STUDENT IN AN ORGANIZED HEALTH CARE EDUCATION/TRAINING PROGRAM

## 2022-09-06 PROCEDURE — 31231 NASAL ENDOSCOPY DX: CPT | Performed by: STUDENT IN AN ORGANIZED HEALTH CARE EDUCATION/TRAINING PROGRAM

## 2022-09-06 PROCEDURE — 69210 REMOVE IMPACTED EAR WAX UNI: CPT | Performed by: STUDENT IN AN ORGANIZED HEALTH CARE EDUCATION/TRAINING PROGRAM

## 2022-09-06 NOTE — PROGRESS NOTES
[address] [facility] JAMAL An (:  1960) is a 60 y.o. male, here for evaluation of the following chief complaint(s):  Cerumen Impaction (Bilateral ) and Sinus Problem      ASSESSMENT/PLAN:  1. Acute swimmer's ear of right side  -     Culture, Anaerobic and Aerobic  2. Chronic ethmoidal sinusitis  3. Chronic rhinitis  4. Nasal congestion  5. Other infective acute otitis externa of both ears      This is a very pleasant 60 y.o. male here today for evaluation of the the above-noted complaints.      -Start VoSol drops, looks fungal  -Dry ear precautions discussed with the patient  -Culture taken from right ear. We will follow this up  -Medrol Dosepak and doxycycline prescribed for exacerbation of his chronic sinusitis.  -Continue nasal steroid sprays  -Continue saline irrigations    The risks of high dose steroids were discussed with the patient in detail. Specifically, the risks of mood changes, memory behavioral or other psychological effects, weight/appetite gain, increased risk of glaucoma or cataracts, blood sugar elevations, osteoporosis, aseptic  femoral head necrosis, peptic ulcer/GI distress, fluid retention, adrenal gland suppression and increased risk of infections. The patient expressed understanding of the risks and wished to proceed with therapy. The risks, benefits and alternatives to antibiotics were discussed with patient in detail including but not limited to the risk of GI upset, xerostomia, anaphylaxis and rash/ sun sensitivity. The patient was instructed to contact me should they develop any adverse reactions or have other concerns. Medical Decision Making:   The following items were considered in medical decision making:  Independent review of images  Review / order clinical lab tests  Review / order radiology tests  Decision to obtain old records  Review and summation of old records as accessed through [facility] if applicable    SUBJECTIVE/OBJECTIVE:  KERRY    Courtney Yepez is here today for evaluation of     Patient has a history of vasomotor rhinitis. He also has associated chronic respiratory symptoms as a result of the chronic nasal drainage. He has been having issues related to severe nasal congestion, ear fullness and facial pain and pressure. No evidence of fevers. No purulent drainage. He was initially treated with a Medrol Dosepak and Allegra, Flonase with no improvement. He was subsequently prescribed amoxicillin. Previously on a Z-Edgardo with no improvement. Patient has a history of sinus surgery number years ago. He gets intermittent facial pain and pressure, nasal drainage which is constant. It is exacerbated by temperature changes and eating. His sense of smell is okay. He is not irrigate. He was using fluticasone in his nose. Update 7/19/2022:    Patient presents today for follow-up regarding issues related to his ears and nose. The patient states that he has been irrigating and using his steroids as prescribed. He tried the Pratropium but felt like it stopped him up and he did not like it. He will also use the eardrops in the left ear and felt like this improved his ear symptoms. He denies any drainage. Update 8/3/2022:    Patient presents today with concern for new issues related to a clogged ear. States that his hearing is diminished and he has had pain and drainage. He started the ciprofloxacin drops per. Update 8/11/2022:    Patient presents today with concerns of ear fullness and decreased hearing and drainage from the right ear. This is been going on for several days. He does not think he got water in his ears.     Update 9/6/2022:    -Bilateral ear fullness, decreased hearing and drainage  -Moderate ear pain  -Significant worsening of his nasal congestion and drainage.  -Just restarted his saline irrigation    REVIEW OF SYSTEMS  The following systems were reviewed and revealed the following in addition to any already discussed in the HPI:    PHYSICAL EXAM    GENERAL: No acute distress, alert and oriented, no hoarseness, strong voice  EYES: EOMI, Anti-icteric  HENT:   Head: Normocephalic and atraumatic. Face:  Symmetric, facial nerve intact        PROCEDURE    Use of Operating Microscope and Cerumen Removal CPT code 69210-right  Indications: Right cerumen impaction obstructing visualization of the tympanic membrane(s). An operating microscope was utilized to visualize the external auditory canals using a speculum. There is evidence of impacted cerumen mixed with debris in the right external auditory canal.  This was removed showing underlying myringitis and inflammation of the external auditory canal.  Boric acid was placed. The left external auditory canal and tympanic membrane with evidence of a large amount of fungal debris mixed with cerumen that was debrided. On the right there is evidence of fungal debris completely obscuring the tympanic membrane this was removed. This was mixed with some cerumen. Nasal Endoscopy (CPT code 37008) from        Due to the patients chronic sinus disease and/or history of sinonasal neoplasm for surveillance a nasal endoscopy with or without debridement will be performed to complete a significant physical examination of the patient which cannot be performed by anterior rhinoscopy alone (failure of complete examination of the paranasal sinuses). Failure to provide this procedure may lead to late detection of significant chronic benign disease, acute exacerbation, resolution or failure of early diagnosis of recurrent cancer. The procedure report is present in the body of the chart. Verbal consent was received.  After topical anesthesia and decongestion had been obtained using aerosolized 1% lidocaine and oxymetazoline, a 45 degree rigid endoscope was placed into both nares with the patient in a sitting

## 2022-09-09 LAB
ANAEROBIC CULTURE: ABNORMAL
GRAM STAIN RESULT: ABNORMAL
ORGANISM: ABNORMAL
WOUND/ABSCESS: ABNORMAL

## 2022-09-21 ENCOUNTER — OFFICE VISIT (OUTPATIENT)
Dept: PRIMARY CARE CLINIC | Age: 62
End: 2022-09-21
Payer: COMMERCIAL

## 2022-09-21 VITALS
RESPIRATION RATE: 16 BRPM | TEMPERATURE: 98.4 F | BODY MASS INDEX: 29.99 KG/M2 | OXYGEN SATURATION: 99 % | WEIGHT: 209 LBS | DIASTOLIC BLOOD PRESSURE: 89 MMHG | SYSTOLIC BLOOD PRESSURE: 142 MMHG | HEART RATE: 80 BPM

## 2022-09-21 DIAGNOSIS — M25.512 ACUTE PAIN OF LEFT SHOULDER: ICD-10-CM

## 2022-09-21 DIAGNOSIS — I42.8 NONISCHEMIC CARDIOMYOPATHY (HCC): ICD-10-CM

## 2022-09-21 DIAGNOSIS — J31.0 CHRONIC RHINITIS: ICD-10-CM

## 2022-09-21 DIAGNOSIS — I10 ESSENTIAL HYPERTENSION: Primary | ICD-10-CM

## 2022-09-21 DIAGNOSIS — E78.5 HYPERLIPIDEMIA LDL GOAL <100: ICD-10-CM

## 2022-09-21 PROCEDURE — 99214 OFFICE O/P EST MOD 30 MIN: CPT | Performed by: FAMILY MEDICINE

## 2022-09-21 SDOH — ECONOMIC STABILITY: FOOD INSECURITY: WITHIN THE PAST 12 MONTHS, THE FOOD YOU BOUGHT JUST DIDN'T LAST AND YOU DIDN'T HAVE MONEY TO GET MORE.: NEVER TRUE

## 2022-09-21 SDOH — ECONOMIC STABILITY: FOOD INSECURITY: WITHIN THE PAST 12 MONTHS, YOU WORRIED THAT YOUR FOOD WOULD RUN OUT BEFORE YOU GOT MONEY TO BUY MORE.: NEVER TRUE

## 2022-09-21 ASSESSMENT — ENCOUNTER SYMPTOMS
DIARRHEA: 0
SHORTNESS OF BREATH: 0
VOICE CHANGE: 0
BLOOD IN STOOL: 0
TROUBLE SWALLOWING: 0
ABDOMINAL PAIN: 0
CONSTIPATION: 0

## 2022-09-21 ASSESSMENT — SOCIAL DETERMINANTS OF HEALTH (SDOH): HOW HARD IS IT FOR YOU TO PAY FOR THE VERY BASICS LIKE FOOD, HOUSING, MEDICAL CARE, AND HEATING?: NOT HARD AT ALL

## 2022-09-21 NOTE — PROGRESS NOTES
2022     Debi Vazquez (:  1960) is a 60 y.o. male, here for evaluation of the following medical concerns:    Shoulder Pain   Patient presented to the office for follow-up. He has hypertension and nonischemic cardiomyopathy, goes to cardiologist Dr. Karla Betts, takes metoprolol 100 mg twice daily and Entresto 1 tablet twice a day. He denies chest pain or shortness of breath denies leg edema. He also have hyperlipidemia and reported to be compliant with a statin, takes Lipitor 40 mg daily without side effect from medication. He  has a chronic rhinitis, chronic sinusitis and chronic ear infection and to ENT worse he was seen multiple times and is still have few upcoming appointment. Patient denies headache nausea vomiting. Denies bowel or urinary disturbance. Review of Systems   Constitutional:  Negative for activity change. HENT:  Negative for trouble swallowing and voice change. Eyes:  Negative for visual disturbance. Respiratory:  Negative for shortness of breath. Cardiovascular:  Negative for chest pain and leg swelling. Gastrointestinal:  Negative for abdominal pain, blood in stool, constipation and diarrhea. Genitourinary:  Negative for difficulty urinating, dysuria, frequency, hematuria and scrotal swelling. Musculoskeletal:  Negative for arthralgias and myalgias. Skin:  Negative for rash. Neurological:  Negative for dizziness. Psychiatric/Behavioral:  Negative for behavioral problems. Prior to Visit Medications    Medication Sig Taking?  Authorizing Provider   fluticasone (FLONASE) 50 MCG/ACT nasal spray SPRAY 2 SPRAYS INTO EACH NOSTRIL EVERY DAY Yes Vani Menchaca MD   atorvastatin (LIPITOR) 40 MG tablet Take 1 tablet by mouth daily Yes Marjan Watson MD   sacubitril-valsartan (ENTRESTO)  MG per tablet Take 1 tablet by mouth 2 times daily Yes Historical Provider, MD   sildenafil (VIAGRA) 100 MG tablet Take 1 tablet by mouth daily as needed for Erectile Dysfunction Yes Veronika Cooney MD   metoprolol succinate (TOPROL XL) 100 MG extended release tablet Take 1 tablet by mouth 2 times daily Yes Veronika Cooney MD   aspirin 325 MG tablet Take 1 tablet by mouth daily  Patient not taking: Reported on 9/21/2022  Veronika Cooney MD        Social History     Tobacco Use    Smoking status: Never    Smokeless tobacco: Never   Substance Use Topics    Alcohol use: Yes     Alcohol/week: 6.0 standard drinks     Types: 6 Cans of beer per week     Comment: daily        Vitals:    09/21/22 1310   BP: (!) 142/89   Pulse: 80   Resp: 16   Temp: 98.4 °F (36.9 °C)   TempSrc: Temporal   SpO2: 99%   Weight: 209 lb (94.8 kg)     Estimated body mass index is 29.99 kg/m² as calculated from the following:    Height as of 9/6/22: 5' 10\" (1.778 m). Weight as of this encounter: 209 lb (94.8 kg). Physical Exam  Constitutional:       Appearance: He is well-developed. HENT:      Head: Normocephalic. Eyes:      Conjunctiva/sclera: Conjunctivae normal.      Pupils: Pupils are equal, round, and reactive to light. Neck:      Thyroid: No thyromegaly. Cardiovascular:      Rate and Rhythm: Normal rate and regular rhythm. Heart sounds: Normal heart sounds. No murmur heard. Pulmonary:      Effort: Pulmonary effort is normal.      Breath sounds: Normal breath sounds. Abdominal:      General: Bowel sounds are normal.      Palpations: Abdomen is soft. There is no mass. Genitourinary:     Penis: Normal.       Prostate: Normal.   Musculoskeletal:         General: Normal range of motion. Cervical back: Normal range of motion and neck supple. Skin:     General: Skin is warm. Findings: No rash. Neurological:      Mental Status: He is alert. Psychiatric:         Behavior: Behavior normal.       ASSESSMENT/PLAN:  1. Essential hypertension  Stable. Continue Toprol- mg twice daily and Entresto 1 tablet twice a day.     2. Nonischemic cardiomyopathy ([address]  Compensated. He goes to cardiologist Dr. Andrade Beauchamp. Continue current medication. 3. Hyperlipidemia LDL goal <100  Controlled. Continue Lipitor 40 mg daily. 4. Chronic rhinitis  Takes Flonase nasal spray and the oral antihistamine. He now goes to ENT Dr. Omid Burnett for chronic nasal congestion and sinusitis. 5. Acute pain of left shoulder  Likely muscular skeletal pain following a strenuous workout the backyard. May take Tylenol and topical cream as needed. May also use ice.       RTC in 6 mos    An electronic signature was used to authenticate this note.    --Hayley Munoz MD on 9/21/2022 at 1:38 PM

## 2022-09-22 ENCOUNTER — OFFICE VISIT (OUTPATIENT)
Dept: ENT CLINIC | Age: 62
End: 2022-09-22
Payer: COMMERCIAL

## 2022-09-22 VITALS
RESPIRATION RATE: 16 BRPM | HEART RATE: 73 BPM | WEIGHT: 209 LBS | BODY MASS INDEX: 29.92 KG/M2 | SYSTOLIC BLOOD PRESSURE: 129 MMHG | HEIGHT: 70 IN | DIASTOLIC BLOOD PRESSURE: 84 MMHG

## 2022-09-22 DIAGNOSIS — J32.2 CHRONIC ETHMOIDAL SINUSITIS: Primary | ICD-10-CM

## 2022-09-22 DIAGNOSIS — H60.393 OTHER INFECTIVE ACUTE OTITIS EXTERNA OF BOTH EARS: ICD-10-CM

## 2022-09-22 DIAGNOSIS — H90.3 SENSORINEURAL HEARING LOSS (SNHL) OF BOTH EARS: ICD-10-CM

## 2022-09-22 DIAGNOSIS — R09.81 NASAL CONGESTION: ICD-10-CM

## 2022-09-22 DIAGNOSIS — J34.2 DEVIATED NASAL SEPTUM: ICD-10-CM

## 2022-09-22 PROCEDURE — 99213 OFFICE O/P EST LOW 20 MIN: CPT | Performed by: STUDENT IN AN ORGANIZED HEALTH CARE EDUCATION/TRAINING PROGRAM

## 2022-09-22 NOTE — PROGRESS NOTES
[address] [facility] JAMAL An (:  1960) is a 60 y.o. male, here for evaluation of the following chief complaint(s):  Follow-up and Ear Problem (Feeling better)      ASSESSMENT/PLAN:  1. Chronic ethmoidal sinusitis  2. Other infective acute otitis externa of both ears  3. Sensorineural hearing loss (SNHL) of both ears  4. Deviated nasal septum  5. Nasal congestion      This is a very pleasant 60 y.o. male here today for evaluation of the the above-noted complaints. -Dry ear precautions discussed with the patient  -Culture taken from right ear grew Aspergillus fumigatus.    -Continue nasal steroid sprays  -Continue saline irrigations      Medical Decision Making: The following items were considered in medical decision making:  Independent review of images  Review / order clinical lab tests  Review / order radiology tests  Decision to obtain old records  Review and summation of old records as accessed through [facility] if applicable    SUBJECTIVE/OBJECTIVE:  KERRY    Jessee Bruce is here today for evaluation of     Patient has a history of vasomotor rhinitis. He also has associated chronic respiratory symptoms as a result of the chronic nasal drainage. He has been having issues related to severe nasal congestion, ear fullness and facial pain and pressure. No evidence of fevers. No purulent drainage. He was initially treated with a Medrol Dosepak and Allegra, Flonase with no improvement. He was subsequently prescribed amoxicillin. Previously on a Z-Edgardo with no improvement. Patient has a history of sinus surgery number years ago. He gets intermittent facial pain and pressure, nasal drainage which is constant. It is exacerbated by temperature changes and eating. His sense of smell is okay. He is not irrigate. He was using fluticasone in his nose.     Update 2022:    Patient presents today for follow-up regarding issues related to his ears and nose. The patient states that he has been irrigating and using his steroids as prescribed. He tried the Pratropium but felt like it stopped him up and he did not like it. He will also use the eardrops in the left ear and felt like this improved his ear symptoms. He denies any drainage. Update 8/3/2022:    Patient presents today with concern for new issues related to a clogged ear. States that his hearing is diminished and he has had pain and drainage. He started the ciprofloxacin drops per. Update 8/11/2022:    Patient presents today with concerns of ear fullness and decreased hearing and drainage from the right ear. This is been going on for several days. He does not think he got water in his ears. Update 9/6/2022:    -Bilateral ear fullness, decreased hearing and drainage  -Moderate ear pain  -Significant worsening of his nasal congestion and drainage.  -Just restarted his saline irrigation    Update 9/22/2022:    Patient presents today for follow-up of ear issues. Decreased drainage and fullness  Getting a lot of postnasal drainage and intermittent congestion    REVIEW OF SYSTEMS  The following systems were reviewed and revealed the following in addition to any already discussed in the HPI:    PHYSICAL EXAM    GENERAL: No acute distress, alert and oriented, no hoarseness, strong voice  EYES: EOMI, Anti-icteric  HENT:   Head: Normocephalic and atraumatic. Face:  Symmetric, facial nerve intact        PROCEDURE  Binocular otoscopic exam CPT 14339: The right ear was examined with the binocular otoscope. Small amount of debris was suction. The external auditory canal was normal.  The tympanic membrane was intact with an aerated middle ear. The left ear was then examined. Small amount of debris was suctioned. The external auditory canal was normal.  The tympanic membrane was intact with an aerated middle ear.             Nasal Endoscopy (CPT code 29484) from prior visit       Due to the patients chronic sinus disease and/or history of sinonasal neoplasm for surveillance a nasal endoscopy with or without debridement will be performed to complete a significant physical examination of the patient which cannot be performed by anterior rhinoscopy alone (failure of complete examination of the paranasal sinuses). Failure to provide this procedure may lead to late detection of significant chronic benign disease, acute exacerbation, resolution or failure of early diagnosis of recurrent cancer. The procedure report is present in the body of the chart. Verbal consent was received. After topical anesthesia and decongestion had been obtained using aerosolized 1% lidocaine and oxymetazoline, a 45 degree rigid endoscope was placed into both nares with the patient in a sitting position. The following was observed:      Septum: intact and deviated   Other:   -The inferior and middle turbinates were examined. The middle meatus, and sphenoethmoid recess was examined bilaterally. -Mild hypertrophy of turbinates. Bilateral endoscopic sinus surgery changes with max antrostomies, sphenoethmoidectomy's. Significant inflammation of the mucosa with no evidence of persistent fungal ball  -There were no complications. Tolerated well without complication. I attest that I was present for and did the entire procedure myself. This note was generated completely or in part utilizing Dragon dictation speech recognition software. Occasionally, words are mistranscribed and despite editing, the text may contain inaccuracies due to incorrect word recognition. If further clarification is needed please contact the office at [phone]. An electronic signature was used to authenticate this note.     --Nancy Rodriguez MD

## 2022-10-07 RX ORDER — ATORVASTATIN CALCIUM 40 MG/1
40 TABLET, FILM COATED ORAL DAILY
Qty: 90 TABLET | Refills: 1 | Status: SHIPPED | OUTPATIENT
Start: 2022-10-07

## 2022-11-16 RX ORDER — METOPROLOL SUCCINATE 100 MG/1
100 TABLET, EXTENDED RELEASE ORAL 2 TIMES DAILY
Qty: 180 TABLET | Refills: 1 | Status: SHIPPED | OUTPATIENT
Start: 2022-11-16

## 2022-12-05 NOTE — TELEPHONE ENCOUNTER
Chief complaint: F/u INR on warfarin    History:  Yun Blackwell is a 66 y.o. female who presents today for follow-up INR check while on warfarin therapy for chronic .    OBJECTIVE:  INR value is 1.5 in the office today.    Assessment/Plan    Diagnoses and all orders for this visit:    1. Chronic atrial fibrillation (CMS/HCC) (Primary)  -     POCT INR    Patient was instructed to double her Warfarin 5 mg for a total of 10 mg tonight. After tonight continue the current dose of alternating 2.5 mg and 5 mg of Warfarin.    Return in about 2 weeks (around 1/18/2021) for Recheck. Sooner if problems arise.         DANIELLE Healy MD  09:15 CST   1/4/2021    Could refill this RX from [address] [place] [address]? He has been on it a year and doing well.  [facility] having a hard time getting a hold of DR Barbara Emery

## 2022-12-12 RX ORDER — FLUTICASONE PROPIONATE 50 MCG
SPRAY, SUSPENSION (ML) NASAL
Qty: 3 EACH | Refills: 1 | Status: SHIPPED | OUTPATIENT
Start: 2022-12-12

## 2023-02-20 RX ORDER — SILDENAFIL 100 MG/1
100 TABLET, FILM COATED ORAL DAILY PRN
Qty: 30 TABLET | Refills: 2 | Status: SHIPPED | OUTPATIENT
Start: 2023-02-20

## 2023-03-22 ENCOUNTER — OFFICE VISIT (OUTPATIENT)
Dept: PRIMARY CARE CLINIC | Age: 63
End: 2023-03-22
Payer: COMMERCIAL

## 2023-03-22 VITALS
RESPIRATION RATE: 18 BRPM | HEIGHT: 70 IN | DIASTOLIC BLOOD PRESSURE: 82 MMHG | OXYGEN SATURATION: 98 % | SYSTOLIC BLOOD PRESSURE: 131 MMHG | HEART RATE: 68 BPM | WEIGHT: 212 LBS | BODY MASS INDEX: 30.35 KG/M2

## 2023-03-22 DIAGNOSIS — I10 ESSENTIAL HYPERTENSION: ICD-10-CM

## 2023-03-22 DIAGNOSIS — I42.8 NONISCHEMIC CARDIOMYOPATHY (HCC): ICD-10-CM

## 2023-03-22 DIAGNOSIS — E78.5 HYPERLIPIDEMIA LDL GOAL <100: ICD-10-CM

## 2023-03-22 DIAGNOSIS — Z12.5 SCREENING PSA (PROSTATE SPECIFIC ANTIGEN): ICD-10-CM

## 2023-03-22 DIAGNOSIS — Z00.00 PREVENTATIVE HEALTH CARE: Primary | ICD-10-CM

## 2023-03-22 LAB
BASOPHILS # BLD: 0.1 K/UL (ref 0–0.2)
BASOPHILS NFR BLD: 2 %
DEPRECATED RDW RBC AUTO: 13.3 % (ref 12.4–15.4)
EOSINOPHIL # BLD: 0.4 K/UL (ref 0–0.6)
EOSINOPHIL NFR BLD: 9.3 %
HCT VFR BLD AUTO: 47.1 % (ref 40.5–52.5)
HGB BLD-MCNC: 15.6 G/DL (ref 13.5–17.5)
LYMPHOCYTES # BLD: 1.3 K/UL (ref 1–5.1)
LYMPHOCYTES NFR BLD: 29.6 %
MCH RBC QN AUTO: 33.2 PG (ref 26–34)
MCHC RBC AUTO-ENTMCNC: 33.2 G/DL (ref 31–36)
MCV RBC AUTO: 100.2 FL (ref 80–100)
MONOCYTES # BLD: 0.7 K/UL (ref 0–1.3)
MONOCYTES NFR BLD: 16.8 %
NEUTROPHILS # BLD: 1.9 K/UL (ref 1.7–7.7)
NEUTROPHILS NFR BLD: 42.3 %
PLATELET # BLD AUTO: 169 K/UL (ref 135–450)
PMV BLD AUTO: 8 FL (ref 5–10.5)
RBC # BLD AUTO: 4.7 M/UL (ref 4.2–5.9)
WBC # BLD AUTO: 4.4 K/UL (ref 4–11)

## 2023-03-22 PROCEDURE — 3079F DIAST BP 80-89 MM HG: CPT | Performed by: FAMILY MEDICINE

## 2023-03-22 PROCEDURE — 3075F SYST BP GE 130 - 139MM HG: CPT | Performed by: FAMILY MEDICINE

## 2023-03-22 PROCEDURE — 99396 PREV VISIT EST AGE 40-64: CPT | Performed by: FAMILY MEDICINE

## 2023-03-22 PROCEDURE — 36415 COLL VENOUS BLD VENIPUNCTURE: CPT | Performed by: FAMILY MEDICINE

## 2023-03-22 SDOH — ECONOMIC STABILITY: FOOD INSECURITY: WITHIN THE PAST 12 MONTHS, YOU WORRIED THAT YOUR FOOD WOULD RUN OUT BEFORE YOU GOT MONEY TO BUY MORE.: NEVER TRUE

## 2023-03-22 SDOH — ECONOMIC STABILITY: HOUSING INSECURITY
IN THE LAST 12 MONTHS, WAS THERE A TIME WHEN YOU DID NOT HAVE A STEADY PLACE TO SLEEP OR SLEPT IN A SHELTER (INCLUDING NOW)?: NO

## 2023-03-22 SDOH — ECONOMIC STABILITY: FOOD INSECURITY: WITHIN THE PAST 12 MONTHS, THE FOOD YOU BOUGHT JUST DIDN'T LAST AND YOU DIDN'T HAVE MONEY TO GET MORE.: NEVER TRUE

## 2023-03-22 SDOH — ECONOMIC STABILITY: INCOME INSECURITY: HOW HARD IS IT FOR YOU TO PAY FOR THE VERY BASICS LIKE FOOD, HOUSING, MEDICAL CARE, AND HEATING?: NOT HARD AT ALL

## 2023-03-22 ASSESSMENT — ENCOUNTER SYMPTOMS
BLOOD IN STOOL: 0
DIARRHEA: 0
SHORTNESS OF BREATH: 0
ABDOMINAL PAIN: 0
CONSTIPATION: 0
TROUBLE SWALLOWING: 0
VOICE CHANGE: 0

## 2023-03-22 ASSESSMENT — PATIENT HEALTH QUESTIONNAIRE - PHQ9
SUM OF ALL RESPONSES TO PHQ QUESTIONS 1-9: 0
SUM OF ALL RESPONSES TO PHQ QUESTIONS 1-9: 0
SUM OF ALL RESPONSES TO PHQ9 QUESTIONS 1 & 2: 0
2. FEELING DOWN, DEPRESSED OR HOPELESS: 0
SUM OF ALL RESPONSES TO PHQ QUESTIONS 1-9: 0
1. LITTLE INTEREST OR PLEASURE IN DOING THINGS: 0
SUM OF ALL RESPONSES TO PHQ QUESTIONS 1-9: 0

## 2023-03-22 NOTE — PROGRESS NOTES
3/22/2023     Erin Giron (:  1960) is a 60 y.o. male, here for evaluation of the following medical concerns:    HPI  Patient presented to the office for annual preventive exam.  He reported that he went to see chiropractor for his back pain and has an x-ray done and was told he might have some kind aneurysm that needs to be checked. Patient has hypertension and cardiomyopathy controlled with Toprol-XL and Entresto, goes to cardiologist Dr. Favian Porras. He has hyperlipidemia reported to be compliant with a statin, takes Lipitor 40 mg daily and tolerating medication without side effect. He has a chronic rhinitis and takes Flonase nasal spray and oral antihistamine. He denies headache nausea vomiting denies chest pain or shortness of breath. Denies bowel or urinary disturbance. Review of Systems   Constitutional:  Negative for activity change. HENT:  Negative for trouble swallowing and voice change. Eyes:  Negative for visual disturbance. Respiratory:  Negative for shortness of breath. Cardiovascular:  Negative for chest pain and leg swelling. Gastrointestinal:  Negative for abdominal pain, blood in stool, constipation and diarrhea. Genitourinary:  Negative for difficulty urinating, dysuria, frequency, hematuria and scrotal swelling. Musculoskeletal:  Negative for arthralgias and myalgias. Skin:  Negative for rash. Neurological:  Negative for dizziness. Psychiatric/Behavioral:  Negative for behavioral problems. Prior to Visit Medications    Medication Sig Taking?  Authorizing Provider   sildenafil (VIAGRA) 100 MG tablet Take 1 tablet by mouth daily as needed for Erectile Dysfunction Yes Antony Haas MD   fluticasone (FLONASE) 50 MCG/ACT nasal spray SPRAY 2 SPRAYS INTO EACH NOSTRIL EVERY DAY Yes Antony Haas MD   sacubitril-valsartan (ENTRESTO)  MG per tablet Take 1 tablet by mouth 2 times daily Yes Antony Haas MD   metoprolol succinate (TOPROL XL) 100 MG

## 2023-03-23 LAB
ALBUMIN SERPL-MCNC: 4.2 G/DL (ref 3.4–5)
ALBUMIN/GLOB SERPL: 1.6 {RATIO} (ref 1.1–2.2)
ALP SERPL-CCNC: 98 U/L (ref 40–129)
ALT SERPL-CCNC: 32 U/L (ref 10–40)
ANION GAP SERPL CALCULATED.3IONS-SCNC: 16 MMOL/L (ref 3–16)
AST SERPL-CCNC: 46 U/L (ref 15–37)
BILIRUB SERPL-MCNC: 0.5 MG/DL (ref 0–1)
BUN SERPL-MCNC: 5 MG/DL (ref 7–20)
CALCIUM SERPL-MCNC: 9.4 MG/DL (ref 8.3–10.6)
CHLORIDE SERPL-SCNC: 95 MMOL/L (ref 99–110)
CHOLEST SERPL-MCNC: 156 MG/DL (ref 0–199)
CO2 SERPL-SCNC: 20 MMOL/L (ref 21–32)
CREAT SERPL-MCNC: 0.7 MG/DL (ref 0.8–1.3)
GFR SERPLBLD CREATININE-BSD FMLA CKD-EPI: >60 ML/MIN/{1.73_M2}
GLUCOSE P FAST SERPL-MCNC: 77 MG/DL (ref 70–99)
HDLC SERPL-MCNC: 68 MG/DL (ref 40–60)
LDL CHOLESTEROL CALCULATED: 72 MG/DL
POTASSIUM SERPL-SCNC: 5 MMOL/L (ref 3.5–5.1)
PROT SERPL-MCNC: 6.9 G/DL (ref 6.4–8.2)
PSA SERPL DL<=0.01 NG/ML-MCNC: 0.91 NG/ML (ref 0–4)
SODIUM SERPL-SCNC: 131 MMOL/L (ref 136–145)
T4 FREE SERPL-MCNC: 1 NG/DL (ref 0.9–1.8)
TRIGL SERPL-MCNC: 80 MG/DL (ref 0–150)
TSH SERPL DL<=0.005 MIU/L-ACNC: 1.84 UIU/ML (ref 0.27–4.2)
VLDLC SERPL CALC-MCNC: 16 MG/DL

## 2023-04-06 RX ORDER — ATORVASTATIN CALCIUM 40 MG/1
TABLET, FILM COATED ORAL
Qty: 90 TABLET | Refills: 1 | Status: SHIPPED | OUTPATIENT
Start: 2023-04-06

## 2023-04-25 ENCOUNTER — HOSPITAL ENCOUNTER (OUTPATIENT)
Dept: ULTRASOUND IMAGING | Age: 63
Discharge: HOME | End: 2023-04-25
Payer: COMMERCIAL

## 2023-04-25 DIAGNOSIS — Z00.00 PREVENTATIVE HEALTH CARE: ICD-10-CM

## 2023-04-25 PROCEDURE — 76775 US EXAM ABDO BACK WALL LIM: CPT

## 2023-05-03 ENCOUNTER — TELEPHONE (OUTPATIENT)
Dept: PRIMARY CARE CLINIC | Age: 63
End: 2023-05-03

## 2023-05-04 NOTE — TELEPHONE ENCOUNTER
Per Dr Gardner [place]:  [address] AAA negative. Left a detailed VM for patient.

## 2023-05-10 RX ORDER — METOPROLOL SUCCINATE 100 MG/1
100 TABLET, EXTENDED RELEASE ORAL 2 TIMES DAILY
Qty: 180 TABLET | Refills: 1 | Status: SHIPPED | OUTPATIENT
Start: 2023-05-10

## 2023-06-07 RX ORDER — SACUBITRIL AND VALSARTAN 97; 103 MG/1; MG/1
1 TABLET, FILM COATED ORAL 2 TIMES DAILY
Qty: 180 TABLET | Refills: 1 | Status: SHIPPED | OUTPATIENT
Start: 2023-06-07

## 2023-06-08 RX ORDER — FLUTICASONE PROPIONATE 50 MCG
SPRAY, SUSPENSION (ML) NASAL
Qty: 16 G | Refills: 2 | Status: SHIPPED | OUTPATIENT
Start: 2023-06-08

## 2023-09-21 ENCOUNTER — OFFICE VISIT (OUTPATIENT)
Dept: PRIMARY CARE CLINIC | Age: 63
End: 2023-09-21
Payer: COMMERCIAL

## 2023-09-21 VITALS
RESPIRATION RATE: 18 BRPM | DIASTOLIC BLOOD PRESSURE: 89 MMHG | OXYGEN SATURATION: 97 % | HEART RATE: 71 BPM | SYSTOLIC BLOOD PRESSURE: 135 MMHG | BODY MASS INDEX: 30.71 KG/M2 | WEIGHT: 211 LBS

## 2023-09-21 DIAGNOSIS — R53.82 CHRONIC FATIGUE: ICD-10-CM

## 2023-09-21 DIAGNOSIS — J31.0 CHRONIC RHINITIS: ICD-10-CM

## 2023-09-21 DIAGNOSIS — I42.8 NONISCHEMIC CARDIOMYOPATHY (HCC): ICD-10-CM

## 2023-09-21 DIAGNOSIS — E78.5 HYPERLIPIDEMIA LDL GOAL <100: ICD-10-CM

## 2023-09-21 DIAGNOSIS — I10 ESSENTIAL HYPERTENSION: ICD-10-CM

## 2023-09-21 PROCEDURE — 3079F DIAST BP 80-89 MM HG: CPT | Performed by: FAMILY MEDICINE

## 2023-09-21 PROCEDURE — 99214 OFFICE O/P EST MOD 30 MIN: CPT | Performed by: FAMILY MEDICINE

## 2023-09-21 PROCEDURE — 3075F SYST BP GE 130 - 139MM HG: CPT | Performed by: FAMILY MEDICINE

## 2023-09-21 ASSESSMENT — ENCOUNTER SYMPTOMS
BLOOD IN STOOL: 0
TROUBLE SWALLOWING: 0
ABDOMINAL PAIN: 0
SHORTNESS OF BREATH: 0
DIARRHEA: 0
VOICE CHANGE: 0
CONSTIPATION: 0

## 2023-09-29 ENCOUNTER — OFFICE VISIT (OUTPATIENT)
Dept: PRIMARY CARE CLINIC | Age: 63
End: 2023-09-29
Payer: COMMERCIAL

## 2023-09-29 VITALS
OXYGEN SATURATION: 100 % | TEMPERATURE: 97 F | DIASTOLIC BLOOD PRESSURE: 93 MMHG | RESPIRATION RATE: 20 BRPM | HEART RATE: 65 BPM | SYSTOLIC BLOOD PRESSURE: 151 MMHG

## 2023-09-29 DIAGNOSIS — J02.9 SORE THROAT: ICD-10-CM

## 2023-09-29 DIAGNOSIS — I42.8 NONISCHEMIC CARDIOMYOPATHY (HCC): ICD-10-CM

## 2023-09-29 DIAGNOSIS — J31.0 CHRONIC RHINITIS: ICD-10-CM

## 2023-09-29 DIAGNOSIS — J06.9 ACUTE URI: ICD-10-CM

## 2023-09-29 DIAGNOSIS — I10 ESSENTIAL HYPERTENSION: ICD-10-CM

## 2023-09-29 PROCEDURE — 3078F DIAST BP <80 MM HG: CPT | Performed by: FAMILY MEDICINE

## 2023-09-29 PROCEDURE — 3074F SYST BP LT 130 MM HG: CPT | Performed by: FAMILY MEDICINE

## 2023-09-29 PROCEDURE — 99214 OFFICE O/P EST MOD 30 MIN: CPT | Performed by: FAMILY MEDICINE

## 2023-09-29 PROCEDURE — 87880 STREP A ASSAY W/OPTIC: CPT | Performed by: FAMILY MEDICINE

## 2023-09-29 RX ORDER — AZITHROMYCIN 250 MG/1
TABLET, FILM COATED ORAL
Qty: 1 PACKET | Refills: 0 | Status: SHIPPED | OUTPATIENT
Start: 2023-09-29 | End: 2023-10-09

## 2023-09-29 RX ORDER — IPRATROPIUM BROMIDE 42 UG/1
2 SPRAY, METERED NASAL 4 TIMES DAILY
Qty: 15 ML | Refills: 3 | Status: SHIPPED | OUTPATIENT
Start: 2023-09-29

## 2023-09-29 RX ORDER — METHYLPREDNISOLONE 4 MG/1
TABLET ORAL
Qty: 1 KIT | Refills: 0 | Status: SHIPPED | OUTPATIENT
Start: 2023-09-29 | End: 2023-10-05

## 2023-09-29 ASSESSMENT — ENCOUNTER SYMPTOMS
CONSTIPATION: 0
COUGH: 1
DIARRHEA: 0
SORE THROAT: 1
TROUBLE SWALLOWING: 0
VOICE CHANGE: 0
SINUS COMPLAINT: 1
WHEEZING: 0
SHORTNESS OF BREATH: 0
ABDOMINAL PAIN: 0
BLOOD IN STOOL: 0

## 2023-09-29 NOTE — PROGRESS NOTES
2023     Doreatha Cranker (:  1960) is a 58 y.o. male, here for evaluation of the following medical concerns:    Sinus Problem  Associated symptoms include congestion, coughing, ear pain and a sore throat. Pertinent negatives include no shortness of breath. Patient presented to the office for checkup. He has a chronic rhinitis with chronic ethmoidal sinusitis has been stable work-up by ENT Dr. Jenifer Yanes  Patient presented to the office complaining of sinus drainage, sore throat, both ear and nose are congested, at times it is hard to breathe especially at night and has been using over-the-counter Afrin nasal spray. He denies fever or chills, chest pain or shortness of breath. He has hypertension and blood pressure is somewhat elevated today despite Entresto and Toprol-XL. He has nonischemic cardiomyopathy appears to be compensated, no sign of volume overload denies weight gain or leg edema. Review of Systems   Constitutional:  Negative for activity change. HENT:  Positive for congestion, ear pain and sore throat. Negative for trouble swallowing and voice change. Eyes:  Negative for visual disturbance. Respiratory:  Positive for cough. Negative for shortness of breath and wheezing. Cardiovascular:  Negative for chest pain and leg swelling. Gastrointestinal:  Negative for abdominal pain, blood in stool, constipation and diarrhea. Genitourinary:  Negative for difficulty urinating, dysuria, frequency, hematuria and scrotal swelling. Musculoskeletal:  Negative for arthralgias and myalgias. Skin:  Negative for rash. Neurological:  Negative for dizziness. Psychiatric/Behavioral:  Negative for behavioral problems. Prior to Visit Medications    Medication Sig Taking?  Authorizing Provider   ipratropium (ATROVENT) 0.06 % nasal spray 2 sprays by Each Nostril route 4 times daily Yes Kennedy Tijerina MD   methylPREDNISolone (MEDROL DOSEPACK) 4 MG tablet Take as directed for 6

## 2023-10-03 RX ORDER — ATORVASTATIN CALCIUM 40 MG/1
TABLET, FILM COATED ORAL
Qty: 90 TABLET | Refills: 1 | Status: SHIPPED | OUTPATIENT
Start: 2023-10-03

## 2023-11-02 RX ORDER — METOPROLOL SUCCINATE 100 MG/1
100 TABLET, EXTENDED RELEASE ORAL 2 TIMES DAILY
Qty: 180 TABLET | Refills: 1 | Status: SHIPPED | OUTPATIENT
Start: 2023-11-02

## 2023-11-10 ENCOUNTER — OFFICE VISIT (OUTPATIENT)
Dept: PRIMARY CARE CLINIC | Age: 63
End: 2023-11-10
Payer: COMMERCIAL

## 2023-11-10 VITALS
BODY MASS INDEX: 30.8 KG/M2 | SYSTOLIC BLOOD PRESSURE: 142 MMHG | OXYGEN SATURATION: 99 % | DIASTOLIC BLOOD PRESSURE: 70 MMHG | WEIGHT: 211.6 LBS

## 2023-11-10 DIAGNOSIS — I42.8 NONISCHEMIC CARDIOMYOPATHY (HCC): ICD-10-CM

## 2023-11-10 DIAGNOSIS — E78.5 HYPERLIPIDEMIA LDL GOAL <100: ICD-10-CM

## 2023-11-10 DIAGNOSIS — E87.1 HYPONATREMIA: Primary | ICD-10-CM

## 2023-11-10 DIAGNOSIS — I10 ESSENTIAL HYPERTENSION: ICD-10-CM

## 2023-11-10 PROCEDURE — 3078F DIAST BP <80 MM HG: CPT | Performed by: FAMILY MEDICINE

## 2023-11-10 PROCEDURE — 3077F SYST BP >= 140 MM HG: CPT | Performed by: FAMILY MEDICINE

## 2023-11-10 PROCEDURE — 99214 OFFICE O/P EST MOD 30 MIN: CPT | Performed by: FAMILY MEDICINE

## 2023-11-10 RX ORDER — RANOLAZINE 500 MG/1
500 TABLET, EXTENDED RELEASE ORAL 2 TIMES DAILY
COMMUNITY
Start: 2023-10-23

## 2023-11-10 ASSESSMENT — ENCOUNTER SYMPTOMS
BLOOD IN STOOL: 0
CONSTIPATION: 0
TROUBLE SWALLOWING: 0
SHORTNESS OF BREATH: 0
DIARRHEA: 0
VOICE CHANGE: 0
ABDOMINAL PAIN: 0

## 2023-11-10 NOTE — PATIENT INSTRUCTIONS
GENERAL OFFICE POLICIES        Telephone Calls: Messages will be answered within 1-2 business days, unless the provider is out of the office. If it is urgent a covering provider will answer. (this does not include Medication refills). MyChart: We recommend all patients sign up for [employer]. Through this portal you can see your lab results, request refills, schedule appointments, pay your bill and send messages to the office. [employer] messages will be answered within 1-2 business days unless the provider is out of the office. For urgent matters, please call the office. Appointments:  All appointments must be scheduled. We ask all patients to schedule their next follow up appointment before they leave the office to make sure you will be able to be seen before you run out of medications. 24 hours' notice is required to cancel or reschedule an appointment to avoid being marked as a no show. You may be dismissed from the practice after 3 no shows. LATE for Appointment: If you are 15 or more minutes late for your appointment, you may be asked to reschedule. MA/LAB APPTS: Must be scheduled, cannot accept walk in lab visits. We only draw labs for patients established in our office. We only do injections for medications ordered by our office. Acute Sick Visits:  Nothing other than acute complaint will be addressed at this visit. TRADITIONAL MEDICARE DOES NOT COVER PHYSICALS  MEDICARE WELLNESS VISITS: These are NOT physicals, but the free annual visit offered by Medicare to discuss wellness issues. Medication refills, checkups, etc. will not be addressed during this visit. Medication Refills: Refills are handled electronically; please contact your pharmacy for refills even if current refills have been exhausted. If you are on a controlled medication, you will be referred to a specialist (pain specialist, psychiatry, etc). Forms:  There is a $35 fee to fill out FMLA/Disability paperwork,

## 2023-11-10 NOTE — PROGRESS NOTES
11/10/2023     Romie Quinonez (:  1960) is a 58 y.o. male, here for evaluation of the following medical concerns:    HPI  Patient presented to the office for follow-up. He went to see his new cardiologist Dr. Ralph Goodwin last 2023 to establish care. During that office visit patient complained of fatigue and dizziness usually tired going uphill and chest pain on exertion no chest pain at rest.  Cardiologist was concerned that the symptoms might be angina equivalent so Ranexa 500 mg twice a day was added to the regiment and patient is scheduled for coronary angiogram next week 11/15/2023 by Dr. Mala Centeno. Has blood test done which incidentally showed sodium of 124 critically low and was advised to see his cardiologist for advised. Patient admitted that for the past 1 year since he retired  has been drinking heavily up to 12 can of beers a day . He denies anxiety or depression. He has hypertension and blood pressure is still elevated despite Toprol- mg twice daily. He has nonischemic cardiomyopathy and takes Entresto 1 tablet twice a day. Has hyperlipidemia compliant with his statin, takes Lipitor 40 mg daily. He denies headache nausea or vomiting. Denies bowel or urinary disturbance. Review of Systems   Constitutional:  Positive for fatigue. Negative for activity change. HENT:  Negative for trouble swallowing and voice change. Eyes:  Negative for visual disturbance. Respiratory:  Negative for shortness of breath. Cardiovascular:  Positive for chest pain. Negative for leg swelling. Gastrointestinal:  Negative for abdominal pain, blood in stool, constipation and diarrhea. Genitourinary:  Negative for difficulty urinating, dysuria, frequency, hematuria and scrotal swelling. Musculoskeletal:  Negative for arthralgias and myalgias. Skin:  Negative for rash. Neurological:  Negative for dizziness. Psychiatric/Behavioral:  Negative for behavioral problems.

## 2023-11-13 LAB
ANION GAP SERPL CALCULATED.3IONS-SCNC: 6 MMOL/L (ref 4–16)
BUN BLDV-MCNC: 15 MG/DL (ref 8–26)
CALCIUM SERPL-MCNC: 9.2 MG/DL (ref 8.5–10.4)
CHLORIDE BLD-SCNC: 96 MEQ/L (ref 98–111)
CO2: 26 MMOL/L (ref 21–31)
CREAT SERPL-MCNC: 1.18 MG/DL (ref 0.7–1.3)
EGFR (CKD-EPI): 69 ML/MIN/1.73 M2
GLUCOSE BLD-MCNC: 119 MG/DL (ref 70–99)
POTASSIUM SERPL-SCNC: 4.4 MEQ/L (ref 3.6–5.1)
SODIUM BLD-SCNC: 128 MEQ/L (ref 135–145)

## 2023-11-28 RX ORDER — SACUBITRIL AND VALSARTAN 97; 103 MG/1; MG/1
1 TABLET, FILM COATED ORAL 2 TIMES DAILY
Qty: 180 TABLET | Refills: 1 | Status: SHIPPED | OUTPATIENT
Start: 2023-11-28

## 2023-12-21 DIAGNOSIS — I10 ESSENTIAL HYPERTENSION: ICD-10-CM

## 2023-12-21 LAB
ANION GAP SERPL CALCULATED.3IONS-SCNC: 7 MMOL/L (ref 3–16)
BUN SERPL-MCNC: 9 MG/DL (ref 7–20)
CALCIUM SERPL-MCNC: 9.5 MG/DL (ref 8.3–10.6)
CHLORIDE SERPL-SCNC: 93 MMOL/L (ref 99–110)
CO2 SERPL-SCNC: 30 MMOL/L (ref 21–32)
CREAT SERPL-MCNC: 0.7 MG/DL (ref 0.8–1.3)
GFR SERPLBLD CREATININE-BSD FMLA CKD-EPI: >60 ML/MIN/{1.73_M2}
GLUCOSE SERPL-MCNC: 95 MG/DL (ref 70–99)
POTASSIUM SERPL-SCNC: 4.7 MMOL/L (ref 3.5–5.1)
SODIUM SERPL-SCNC: 130 MMOL/L (ref 136–145)

## 2023-12-23 PROBLEM — E87.1 CHRONIC HYPONATREMIA: Status: ACTIVE | Noted: 2023-12-23

## 2024-03-29 RX ORDER — ATORVASTATIN CALCIUM 40 MG/1
TABLET, FILM COATED ORAL
Qty: 90 TABLET | Refills: 1 | Status: SHIPPED | OUTPATIENT
Start: 2024-03-29

## 2024-03-29 NOTE — TELEPHONE ENCOUNTER
Medication:   Requested Prescriptions     Pending Prescriptions Disp Refills    atorvastatin (LIPITOR) 40 MG tablet [Pharmacy Med Name: ATORVASTATIN 40 MG TABLET] 90 tablet 1     Sig: TAKE 1 TABLET BY MOUTH EVERY DAY        Last Filled:      Patient Phone Number: [phone] (home) [phone] (work)    Last appt: 12/21/2023   Next appt: Visit date not found    Last OARRS:        No data to display

## 2024-04-04 RX ORDER — SILDENAFIL 100 MG/1
100 TABLET, FILM COATED ORAL DAILY PRN
Qty: 30 TABLET | Refills: 3 | Status: SHIPPED | OUTPATIENT
Start: 2024-04-04

## 2024-04-04 NOTE — TELEPHONE ENCOUNTER
Medication:   Requested Prescriptions     Pending Prescriptions Disp Refills    sildenafil (VIAGRA) 100 MG tablet [Pharmacy Med Name: SILDENAFIL 100 MG TABLET] 6 tablet      Sig: TAKE 1 TABLET BY MOUTH DAILY AS NEEDED FOR ERECTILE DYSFUNCTION        Last Filled:      Patient Phone Number: [phone] (home) [phone] (work)    Last appt: 12/21/2023   Next appt: Visit date not found    Last OARRS:        No data to display

## 2024-05-16 RX ORDER — METOPROLOL SUCCINATE 100 MG/1
100 TABLET, EXTENDED RELEASE ORAL 2 TIMES DAILY
Qty: 180 TABLET | Refills: 1 | Status: SHIPPED | OUTPATIENT
Start: 2024-05-16

## 2024-05-16 NOTE — TELEPHONE ENCOUNTER
Medication:   Requested Prescriptions     Pending Prescriptions Disp Refills    metoprolol succinate (TOPROL XL) 100 MG extended release tablet [Pharmacy Med Name: METOPROLOL SUCC  MG TAB] 180 tablet 1     Sig: TAKE 1 TABLET BY MOUTH TWICE A DAY        Last Filled:      Patient Phone Number: [phone] (home) [phone] (work)    Last appt: 12/21/2023   Next appt: Visit date not found    Last OARRS:        No data to display                  
55 y/o Male presents to the ED c/o right lower back pain. States he was seen in this ED approximately 1 year ago and was given pain medication that resolved the pain. Today, pain started and denies recent trauma and falls. States pain is exacerbated when he stands and walks.

## 2024-05-30 RX ORDER — SACUBITRIL AND VALSARTAN 97; 103 MG/1; MG/1
1 TABLET, FILM COATED ORAL 2 TIMES DAILY
Qty: 180 TABLET | Refills: 1 | Status: SHIPPED | OUTPATIENT
Start: 2024-05-30

## 2024-07-15 ENCOUNTER — OFFICE VISIT (OUTPATIENT)
Dept: ORTHOPEDIC SURGERY | Age: 64
End: 2024-07-15
Payer: MEDICARE

## 2024-07-15 VITALS — HEIGHT: 70 IN | BODY MASS INDEX: 30.06 KG/M2 | WEIGHT: 210 LBS

## 2024-07-15 DIAGNOSIS — M19.011 PRIMARY OSTEOARTHRITIS OF RIGHT SHOULDER: ICD-10-CM

## 2024-07-15 DIAGNOSIS — M54.2 NECK PAIN: ICD-10-CM

## 2024-07-15 DIAGNOSIS — M25.511 RIGHT SHOULDER PAIN, UNSPECIFIED CHRONICITY: Primary | ICD-10-CM

## 2024-07-15 DIAGNOSIS — M50.30 DDD (DEGENERATIVE DISC DISEASE), CERVICAL: ICD-10-CM

## 2024-07-15 DIAGNOSIS — M54.12 CERVICAL RADICULOPATHY: ICD-10-CM

## 2024-07-15 PROCEDURE — 99204 OFFICE O/P NEW MOD 45 MIN: CPT | Performed by: STUDENT IN AN ORGANIZED HEALTH CARE EDUCATION/TRAINING PROGRAM

## 2024-07-15 PROCEDURE — 1123F ACP DISCUSS/DSCN MKR DOCD: CPT | Performed by: STUDENT IN AN ORGANIZED HEALTH CARE EDUCATION/TRAINING PROGRAM

## 2024-07-15 SDOH — HEALTH STABILITY: PHYSICAL HEALTH: ON AVERAGE, HOW MANY MINUTES DO YOU ENGAGE IN EXERCISE AT THIS LEVEL?: 60 MIN

## 2024-07-15 SDOH — HEALTH STABILITY: PHYSICAL HEALTH: ON AVERAGE, HOW MANY DAYS PER WEEK DO YOU ENGAGE IN MODERATE TO STRENUOUS EXERCISE (LIKE A BRISK WALK)?: 4 DAYS

## 2024-07-15 NOTE — PROGRESS NOTES
Date:  7/15/2024    Name:  Charlie An  Address:  [address] [place] [address]    :  1960      Age:   64 y.o.    SSN:  [id number]      Medical Record Number:  [id number]    Reason for Visit:    Chief Complaint    Shoulder Pain (Old patient / new problem right shoulder )      DOS:7/15/2024     HPI: Charlie An is a 64 y.o. male here today for his right shoulder and neck.  He has some mild chronic right shoulder pain although he said 2 months of increasing neck pain and right upper extremity pain.  He has pain that radiates from the neck down the shoulder into the arm.  He describes occasional numbness and tingling in the hand as well.  He has been seeing a chiropractor for his neck although this has not provided significant relief.  He also had some additional shoulder pain that occurred when he was bench pressing this past week he had sharp pain in the pectoralis region but he denied any significant bruising or swelling or any deformity.  He has been taking some time off lifting weights.  He is otherwise in his usual state of health.      Pain Assessment  Location of Pain: Shoulder  Location Modifiers: Right  Severity of Pain: 3  Quality of Pain: Sharp, Aching  Duration of Pain: A few minutes  Frequency of Pain: Intermittent  Aggravating Factors:  (movement)  Limiting Behavior: No  Relieving Factors: Rest  Result of Injury: No  Work-Related Injury: No  Are there other pain locations you wish to document?: No  ROS: Review of systems reviewed from Patient History Form completed today and available in the patient's chart under the Media tab.       Past Medical History:   Diagnosis Date    Allergic rhinitis     CAD (coronary artery disease)     saw in past 6 months and no issues    Cardiomyopathy (HCC)     resolved?    Colon polyp     Glenohumeral arthritis 2014    HTN (hypertension)     Hyperlipidemia     IBS (irritable bowel syndrome)     Low back pain     Lumbar disc herniation     Rectal

## 2024-07-17 ENCOUNTER — EVALUATION (OUTPATIENT)
Dept: PHYSICAL THERAPY | Age: 64
End: 2024-07-17
Payer: MEDICARE

## 2024-07-17 DIAGNOSIS — M54.2 NECK PAIN: ICD-10-CM

## 2024-07-17 DIAGNOSIS — G89.29 CHRONIC RIGHT SHOULDER PAIN: Primary | ICD-10-CM

## 2024-07-17 DIAGNOSIS — M25.511 CHRONIC RIGHT SHOULDER PAIN: Primary | ICD-10-CM

## 2024-07-17 DIAGNOSIS — M19.011 OSTEOARTHRITIS OF RIGHT SHOULDER, UNSPECIFIED OSTEOARTHRITIS TYPE: ICD-10-CM

## 2024-07-17 DIAGNOSIS — M50.30 DEGENERATION OF CERVICAL INTERVERTEBRAL DISC: ICD-10-CM

## 2024-07-17 DIAGNOSIS — M54.12 RADICULAR SYNDROME OF UPPER LIMBS: ICD-10-CM

## 2024-07-17 PROCEDURE — 97110 THERAPEUTIC EXERCISES: CPT | Performed by: PHYSICAL THERAPIST

## 2024-07-17 PROCEDURE — 97161 PT EVAL LOW COMPLEX 20 MIN: CPT | Performed by: PHYSICAL THERAPIST

## 2024-07-17 PROCEDURE — 97530 THERAPEUTIC ACTIVITIES: CPT | Performed by: PHYSICAL THERAPIST

## 2024-07-17 NOTE — PROGRESS NOTES
[facility]    [address] [place] [address]   P:[phone]  F:[phone]     Physical Therapy Initial Evaluation Certification      Dear Dev Guevara MD,    We had the pleasure of evaluating the following patient for physical therapy services at [facility].  A summary of our findings can be found in the initial assessment below.  This includes our plan of care.  If you have any questions or concerns regarding these findings, please do not hesitate to contact me at the office phone number listed above.  Thank you for the referral.     Physician Signature:_______________________________Date:__________________  By signing above (or electronic signature), therapist’s plan is approved by physician       Physical Therapy: TREATMENT/PROGRESS NOTE   Patient: Charlie An (64 y.o. male)   Examination Date: 2024   :  1960 MRN: [id number]   Visit #: 1   Insurance Allowable Auth Needed   BMN []Yes    []No    Insurance: Payor: CIGNA MEDICARE / Plan: [employer] / Product Type: *No Product type* /   Insurance ID: [id number] - (Medicare Managed)  Secondary Insurance (if applicable):    Treatment Diagnosis: shoulder pain, cervical pain   Medical Diagnosis:  Right shoulder pain, unspecified chronicity [M25.511];Neck pain [M54.2];DDD (degenerative disc disease), cervical [M50.30];Cervical radiculopathy [M54.12];Primary osteoarthritis of right shoulder [M19.011]   Referring Physician: Dev Guevara MD  PCP: Mukund Spencer MD     Plan of care signed (Y/N): sent 24    Date of Patient follow up with Physician:      Progress Report/POC: EVAL today  POC update due: (10 visits /OR AUTH LIMITS, whichever is less)  2024                                             Precautions/ Contra-indications:           Latex allergy:  NO  Pacemaker:    YES  Contraindications for Manipulation: None  Date of Surgery:

## 2024-07-22 ENCOUNTER — TREATMENT (OUTPATIENT)
Dept: PHYSICAL THERAPY | Age: 64
End: 2024-07-22
Payer: MEDICARE

## 2024-07-22 DIAGNOSIS — M50.30 DEGENERATION OF CERVICAL INTERVERTEBRAL DISC: ICD-10-CM

## 2024-07-22 DIAGNOSIS — M19.011 OSTEOARTHRITIS OF RIGHT SHOULDER, UNSPECIFIED OSTEOARTHRITIS TYPE: ICD-10-CM

## 2024-07-22 DIAGNOSIS — M25.511 CHRONIC RIGHT SHOULDER PAIN: Primary | ICD-10-CM

## 2024-07-22 DIAGNOSIS — M54.12 RADICULAR SYNDROME OF UPPER LIMBS: ICD-10-CM

## 2024-07-22 DIAGNOSIS — G89.29 CHRONIC RIGHT SHOULDER PAIN: Primary | ICD-10-CM

## 2024-07-22 DIAGNOSIS — M54.2 NECK PAIN: ICD-10-CM

## 2024-07-22 PROCEDURE — 97112 NEUROMUSCULAR REEDUCATION: CPT | Performed by: PHYSICAL THERAPIST

## 2024-07-22 PROCEDURE — 97140 MANUAL THERAPY 1/> REGIONS: CPT | Performed by: PHYSICAL THERAPIST

## 2024-07-22 PROCEDURE — 97110 THERAPEUTIC EXERCISES: CPT | Performed by: PHYSICAL THERAPIST

## 2024-07-22 NOTE — PROGRESS NOTES
Quick DASH to assist with reaching prior level of function with activities such as ADL's.  [] Progressing: [] Met: [] Not Met: [] Adjusted  2. Patient will demonstrate increased AROM of neck and R shoulder to WFL's without pain to allow for proper joint functioning to enable patient to safe driving and putting groceries away.   [] Progressing: [] Met: [] Not Met: [] Adjusted  3. Patient will demonstrate increased Strength of R UE to at least 5/5 throughout without pain to allow for proper functional mobility to enable patient to return to  gym program.   [] Progressing: [] Met: [] Not Met: [] Adjusted  4. Patient will return to all activities to include lifting wts without increased symptoms or restriction.   [] Progressing: [] Met: [] Not Met: [] Adjusted  5. Pt will be able to sleep at night and perform overhead activities w/o deviations and pain. (patient specific functional goal)    [] Progressing: [] Met: [] Not Met: [] Adjusted     Overall Progression Towards Functional goals/ Treatment Progress Update:  [] Patient is progressing as expected towards functional goals listed.    [] Progression is slowed due to complexities/Impairments listed.  [] Progression has been slowed due to co-morbidities.  [x] Plan just implemented, too soon (<30days) to assess goals progression   [] Goals require adjustment due to lack of progress  [] Patient is not progressing as expected and requires additional follow up with physician  [] Other:     TREATMENT PLAN     Frequency/Duration: 2x/week for 6 weeks for the following treatment interventions:    Interventions:  Therapeutic Exercise (42043) including: strength training, ROM, and functional mobility  Therapeutic Activities (98278) including: functional mobility training and education.  Neuromuscular Re-education (07473) activation and proprioception, including postural re-education.    Manual Therapy (40549) as indicated to include: Passive Range of Motion and Soft Tissue

## 2024-07-24 ENCOUNTER — OFFICE VISIT (OUTPATIENT)
Dept: ORTHOPEDIC SURGERY | Age: 64
End: 2024-07-24

## 2024-07-24 ENCOUNTER — TREATMENT (OUTPATIENT)
Dept: PHYSICAL THERAPY | Age: 64
End: 2024-07-24
Payer: MEDICARE

## 2024-07-24 VITALS — WEIGHT: 210 LBS | BODY MASS INDEX: 30.06 KG/M2 | HEIGHT: 70 IN

## 2024-07-24 DIAGNOSIS — M54.12 CERVICAL RADICULOPATHY: ICD-10-CM

## 2024-07-24 DIAGNOSIS — M54.2 NECK PAIN: ICD-10-CM

## 2024-07-24 DIAGNOSIS — M54.12 RADICULAR SYNDROME OF UPPER LIMBS: ICD-10-CM

## 2024-07-24 DIAGNOSIS — M25.511 CHRONIC RIGHT SHOULDER PAIN: Primary | ICD-10-CM

## 2024-07-24 DIAGNOSIS — M19.011 OSTEOARTHRITIS OF RIGHT SHOULDER, UNSPECIFIED OSTEOARTHRITIS TYPE: ICD-10-CM

## 2024-07-24 DIAGNOSIS — G89.29 CHRONIC RIGHT SHOULDER PAIN: Primary | ICD-10-CM

## 2024-07-24 DIAGNOSIS — M50.30 DDD (DEGENERATIVE DISC DISEASE), CERVICAL: Primary | ICD-10-CM

## 2024-07-24 DIAGNOSIS — M50.30 DEGENERATION OF CERVICAL INTERVERTEBRAL DISC: ICD-10-CM

## 2024-07-24 PROCEDURE — 97110 THERAPEUTIC EXERCISES: CPT | Performed by: PHYSICAL THERAPIST

## 2024-07-24 PROCEDURE — 97140 MANUAL THERAPY 1/> REGIONS: CPT | Performed by: PHYSICAL THERAPIST

## 2024-07-24 PROCEDURE — 97112 NEUROMUSCULAR REEDUCATION: CPT | Performed by: PHYSICAL THERAPIST

## 2024-07-24 NOTE — PROGRESS NOTES
[facility]    [address] [place] [address]   P:[phone]  F:[phone]      Physical Therapy: TREATMENT/PROGRESS NOTE   Patient: Charlie An (64 y.o. male)   Examination Date: 2024   :  1960 MRN: [id number]   Visit #: 3   Insurance Allowable Auth Needed   BMN []Yes    []No    Insurance: Payor: CIGNA MEDICARE / Plan: [employer] / Product Type: *No Product type* /   Insurance ID: [id number] - (Medicare Managed)  Secondary Insurance (if applicable):    Treatment Diagnosis: shoulder pain, cervical pain   Medical Diagnosis:  Right shoulder pain, unspecified chronicity [M25.511];Neck pain [M54.2];DDD (degenerative disc disease), cervical [M50.30];Cervical radiculopathy [M54.12];Primary osteoarthritis of right shoulder [M19.011]   Referring Physician: Dev Guevara MD  PCP: Mukund Spencer MD     Plan of care signed (Y/N): sent 24    Date of Patient follow up with Physician:      Progress Report/POC: NO  POC update due: (10 visits /OR AUTH LIMITS, whichever is less)  2024                                             Precautions/ Contra-indications:           Latex allergy:  NO  Pacemaker:    YES  Contraindications for Manipulation: None  Date of Surgery: NA  Other:    Red Flags:  None    C-SSRS Triggered by Intake questionnaire:   Patient answered 'NO' to both behavioral questions on intake.  No further screening warranted    Preferred Language for Healthcare:   [x] English       [] other:    SUBJECTIVE EXAMINATION     Patient stated complaint: Pt has not had to take medication due to no pain. He did go to gym yesterday and worked out with . Trainer has adjusted program for upper back strengthening vs chest. Pt will be seeing a MD today concerning his neck.       Test used Initial score  2024   Pain Summary VAS 4/10    Functional questionnaire Quick DASH  NDI 31  7/14%    Other:

## 2024-07-24 NOTE — PROGRESS NOTES
New Patient: CERVICAL SPINE    Referring Provider:  Dev Guevara MD    CHIEF COMPLAINT:    Chief Complaint   Patient presents with    Neck Pain     cervical       HISTORY OF PRESENT ILLNESS:   Mr. Charlie An is a pleasant 64 y.o. old male here for evaluation regarding his neck and right arm pain. He states the pain began insidiously about 3 months ago.  He states that he initially seen Dr. Guevara for his right shoulder who did identify that he had arthritis in his neck and referred him to therapy.  He states since then he has had intermittent pain which she rates 1-2/10 within his cervical spine and 1-2/10 within the right shoulder.  He states that the radicular pain into the right upper extremity has diminished.  He states that he does at times have difficulty with sleeping but has had no ongoing pain.  He does occasionally have numbness and tingling into the right hand but denies any weakness,  strength deficit, or difficulty with fine motor movements.  He is currently enrolled in physical therapy and has been taking Naprosyn as needed.  Pain Assessment  Location of Pain: Neck  Location Modifiers: Other (Comment)  Severity of Pain: 1  Quality of Pain: Other (Comment)  Duration of Pain: Other (Comment)  Frequency of Pain: Other (Comment)].    Current/Past Treatment:   Physical Therapy: Currently with benefit  Chiropractic: In the past with no durable benefit  Injection: None  Medications: Naprosyn as needed    Past Medical History:   Past Medical History:   Diagnosis Date    Allergic rhinitis     CAD (coronary artery disease)     saw in past 6 months and no issues    Cardiomyopathy (HCC)     resolved?    Colon polyp     Glenohumeral arthritis 2/17/2014    HTN (hypertension)     Hyperlipidemia     IBS (irritable bowel syndrome)     Low back pain     Lumbar disc herniation     Rectal lesion     Rotator cuff tear, left     Rotator cuff tendonitis 2/17/2014    Shoulder pain 2/17/2014        Past

## 2024-07-29 ENCOUNTER — TREATMENT (OUTPATIENT)
Dept: PHYSICAL THERAPY | Age: 64
End: 2024-07-29
Payer: MEDICARE

## 2024-07-29 DIAGNOSIS — M54.12 RADICULAR SYNDROME OF UPPER LIMBS: ICD-10-CM

## 2024-07-29 DIAGNOSIS — G89.29 CHRONIC RIGHT SHOULDER PAIN: Primary | ICD-10-CM

## 2024-07-29 DIAGNOSIS — M19.011 OSTEOARTHRITIS OF RIGHT SHOULDER, UNSPECIFIED OSTEOARTHRITIS TYPE: ICD-10-CM

## 2024-07-29 DIAGNOSIS — M54.2 NECK PAIN: ICD-10-CM

## 2024-07-29 DIAGNOSIS — M25.511 CHRONIC RIGHT SHOULDER PAIN: Primary | ICD-10-CM

## 2024-07-29 DIAGNOSIS — M50.30 DEGENERATION OF CERVICAL INTERVERTEBRAL DISC: ICD-10-CM

## 2024-07-29 PROCEDURE — 97112 NEUROMUSCULAR REEDUCATION: CPT | Performed by: PHYSICAL THERAPIST

## 2024-07-29 PROCEDURE — 97110 THERAPEUTIC EXERCISES: CPT | Performed by: PHYSICAL THERAPIST

## 2024-07-29 PROCEDURE — 97140 MANUAL THERAPY 1/> REGIONS: CPT | Performed by: PHYSICAL THERAPIST

## 2024-07-29 NOTE — PROGRESS NOTES
[facility]    [address] [place] [address]   P:[phone]  F:[phone]      Physical Therapy: TREATMENT/PROGRESS NOTE   Patient: Charlie An (64 y.o. male)   Examination Date: 2024   :  1960 MRN: [id number]   Visit #: 3   Insurance Allowable Auth Needed   BMN []Yes    []No    Insurance: Payor: CIGNA MEDICARE / Plan: [employer] / Product Type: *No Product type* /   Insurance ID: [id number] - (Medicare Managed)  Secondary Insurance (if applicable):    Treatment Diagnosis: shoulder pain, cervical pain   Medical Diagnosis:  Right shoulder pain, unspecified chronicity [M25.511];Neck pain [M54.2];DDD (degenerative disc disease), cervical [M50.30];Cervical radiculopathy [M54.12];Primary osteoarthritis of right shoulder [M19.011]   Referring Physician: Dev Guevara MD  PCP: Mukund Spencer MD     Plan of care signed (Y/N): sent 24    Date of Patient follow up with Physician:      Progress Report/POC: NO  POC update due: (10 visits /OR AUTH LIMITS, whichever is less)  2024                                             Precautions/ Contra-indications:           Latex allergy:  NO  Pacemaker:    YES  Contraindications for Manipulation: None  Date of Surgery: NA  Other:    Red Flags:  None    C-SSRS Triggered by Intake questionnaire:   Patient answered 'NO' to both behavioral questions on intake.  No further screening warranted    Preferred Language for Healthcare:   [x] English       [] other:    SUBJECTIVE EXAMINATION     Patient stated complaint: MD decided not to do anything w/ back since current therapy was helping. Pt having min pain in R upper neck and shoulder region. Pt has been going to gym w/  w/o any adverse effects. Pt has been focusing on upper back at gym. Denies any HA.       Test used Initial score  2024   Pain Summary VAS 4/10    Functional questionnaire Quick DASH  NDI 31  7/14%

## 2024-08-05 ENCOUNTER — TREATMENT (OUTPATIENT)
Dept: PHYSICAL THERAPY | Age: 64
End: 2024-08-05
Payer: MEDICARE

## 2024-08-05 DIAGNOSIS — G89.29 CHRONIC RIGHT SHOULDER PAIN: Primary | ICD-10-CM

## 2024-08-05 DIAGNOSIS — M54.12 RADICULAR SYNDROME OF UPPER LIMBS: ICD-10-CM

## 2024-08-05 DIAGNOSIS — M50.30 DEGENERATION OF CERVICAL INTERVERTEBRAL DISC: ICD-10-CM

## 2024-08-05 DIAGNOSIS — M19.011 OSTEOARTHRITIS OF RIGHT SHOULDER, UNSPECIFIED OSTEOARTHRITIS TYPE: ICD-10-CM

## 2024-08-05 DIAGNOSIS — M25.511 CHRONIC RIGHT SHOULDER PAIN: Primary | ICD-10-CM

## 2024-08-05 PROCEDURE — 97112 NEUROMUSCULAR REEDUCATION: CPT | Performed by: PHYSICAL THERAPIST

## 2024-08-05 PROCEDURE — 97140 MANUAL THERAPY 1/> REGIONS: CPT | Performed by: PHYSICAL THERAPIST

## 2024-08-05 PROCEDURE — 97110 THERAPEUTIC EXERCISES: CPT | Performed by: PHYSICAL THERAPIST

## 2024-08-05 NOTE — PROGRESS NOTES
[facility]    [address] [place] [address]   P:[phone]  F:[phone]      Physical Therapy: TREATMENT/PROGRESS NOTE   Patient: Charlie An (64 y.o. male)   Examination Date: 2024   :  1960 MRN: [id number]   Visit #: 5   Insurance Allowable Auth Needed   BMN []Yes    []No    Insurance: Payor: CIGNA MEDICARE / Plan: [employer] [facility] / Product Type: *No Product type* /   Insurance ID: [id number] - (Medicare Managed)  Secondary Insurance (if applicable):    Treatment Diagnosis: shoulder pain, cervical pain   Medical Diagnosis:  Right shoulder pain, unspecified chronicity [M25.511];Neck pain [M54.2];DDD (degenerative disc disease), cervical [M50.30];Cervical radiculopathy [M54.12];Primary osteoarthritis of right shoulder [M19.011]   Referring Physician: Dev Guevara MD  PCP: Mukund Spencer MD     Plan of care signed (Y/N): sent 24    Date of Patient follow up with Physician:      Progress Report/POC: NO  POC update due: (10 visits /OR AUTH LIMITS, whichever is less)  2024                                             Precautions/ Contra-indications:           Latex allergy:  NO  Pacemaker:    YES  Contraindications for Manipulation: None  Date of Surgery: NA  Other:    Red Flags:  None    C-SSRS Triggered by Intake questionnaire:   Patient answered 'NO' to both behavioral questions on intake.  No further screening warranted    Preferred Language for Healthcare:   [x] English       [] other:    SUBJECTIVE EXAMINATION     Patient stated complaint: Patient reports that about 3 days after his last session his symptoms significantly increased with pain and tightness in the R shoulder and N&T in the R UE. He notes that he also started a new gym routine yesterday with his  the day after last session. He is leaving for a 4 day trip to [place] tomorrow.        Test used Initial score  2024   Pain

## 2024-08-12 ENCOUNTER — OFFICE VISIT (OUTPATIENT)
Dept: ORTHOPEDIC SURGERY | Age: 64
End: 2024-08-12
Payer: MEDICARE

## 2024-08-12 VITALS — HEIGHT: 70 IN | BODY MASS INDEX: 30.06 KG/M2 | WEIGHT: 210 LBS

## 2024-08-12 DIAGNOSIS — M19.011 PRIMARY OSTEOARTHRITIS OF RIGHT SHOULDER: Primary | ICD-10-CM

## 2024-08-12 PROCEDURE — 1123F ACP DISCUSS/DSCN MKR DOCD: CPT | Performed by: ORTHOPAEDIC SURGERY

## 2024-08-12 PROCEDURE — 99212 OFFICE O/P EST SF 10 MIN: CPT | Performed by: ORTHOPAEDIC SURGERY

## 2024-08-12 NOTE — PROGRESS NOTES
Chief Complaint    Follow-up (Right shoulder )      History of Present Illness:  Charlie An is a 64 y.o. male here today for follow up evaluation of the right shoulder. He was diagnosed with glenohumeral arthritis and referred to formal, supervised physical therapy. He was also prescribed meloxicam but never received the medicine. He returns today stating he has made good progress in physical therapy and continues to trend in a good direction. He also has known cervical spine arthritis, currently under the care of [facility], and has seen improvement in these symptoms as well.    Medical History:  Patient's medications, allergies, past medical, surgical, social and family histories were reviewed and updated as appropriate.    Pertinent items are noted in HPI  Review of systems reviewed from Patient History Form completed today and available in the patient's chart under the Media tab.       Pain Assessment  Location of Pain: Shoulder  Location Modifiers: Right  Severity of Pain: 1  Duration of Pain: A few minutes  Frequency of Pain: Intermittent  Limiting Behavior: No  Relieving Factors: Rest  Result of Injury: No  Work-Related Injury: No  Are there other pain locations you wish to document?: No    Past Medical History:   Diagnosis Date    Allergic rhinitis     CAD (coronary artery disease)     saw in past 6 months and no issues    Cardiomyopathy (HCC)     resolved?    Colon polyp     Glenohumeral arthritis 2/17/2014    HTN (hypertension)     Hyperlipidemia     IBS (irritable bowel syndrome)     Low back pain     Lumbar disc herniation     Rectal lesion     Rotator cuff tear, left     Rotator cuff tendonitis 2/17/2014    Shoulder pain 2/17/2014        Past Surgical History:   Procedure Laterality Date    COLONOSCOPY N/A 5/19/2022    COLORECTAL CANCER SCREENING, NOT HIGH RISK performed by Castillo Fuentes MD at [facility] ENDOSCOPY    ROTATOR CUFF REPAIR Left        History reviewed. No pertinent family

## 2024-08-14 ENCOUNTER — TREATMENT (OUTPATIENT)
Dept: PHYSICAL THERAPY | Age: 64
End: 2024-08-14
Payer: MEDICARE

## 2024-08-14 DIAGNOSIS — M54.2 NECK PAIN: ICD-10-CM

## 2024-08-14 DIAGNOSIS — M54.12 RADICULAR SYNDROME OF UPPER LIMBS: ICD-10-CM

## 2024-08-14 DIAGNOSIS — M25.511 CHRONIC RIGHT SHOULDER PAIN: Primary | ICD-10-CM

## 2024-08-14 DIAGNOSIS — G89.29 CHRONIC RIGHT SHOULDER PAIN: Primary | ICD-10-CM

## 2024-08-14 DIAGNOSIS — M50.30 DEGENERATION OF CERVICAL INTERVERTEBRAL DISC: ICD-10-CM

## 2024-08-14 DIAGNOSIS — M19.011 OSTEOARTHRITIS OF RIGHT SHOULDER, UNSPECIFIED OSTEOARTHRITIS TYPE: ICD-10-CM

## 2024-08-14 PROCEDURE — 97110 THERAPEUTIC EXERCISES: CPT | Performed by: PHYSICAL THERAPIST

## 2024-08-14 PROCEDURE — 97140 MANUAL THERAPY 1/> REGIONS: CPT | Performed by: PHYSICAL THERAPIST

## 2024-08-14 PROCEDURE — 97112 NEUROMUSCULAR REEDUCATION: CPT | Performed by: PHYSICAL THERAPIST

## 2024-08-14 NOTE — PROGRESS NOTES
Pain:  Pain location: lat R neck region that radiates down R UE into the hand  Patient describes pain to be intermittent, aching, shooting, numbness, and tingling  Pain decreases with: Resting and Medication  Pain increases with: Activity and Movement     Relevant Medical History: HBP,  cardiac- PACEMAKER  ; achilles surg bilat, L and R shoulder surg;     Living status: lives w/ family members;    Occupation/School:  Work/School Status: Retired  Job Duties/Demands: NA    Sport/ Recreation/ Leisure/ Hobbies: walking, lifting wts w/ ;       OBJECTIVE EXAMINATION    From MD's note on 7/15/24  X-rays of the right shoulder including Grashey, scapular Y and  axillary views were obtained and reviewed in office:  Impression: Severe right shoulder glenohumeral arthritis  Plain radiographs of the cervical spine demonstrate diffuse significant degenerative change throughout the cervical spine and most significant at C3-C4 and C5-C6.      7/17/24  ROM/Strength: (Blank cells denote NT)    Mvmt (norm) AROM L AROM R Notes PROM L PROM R Notes     CERVICAL Flex (60) WNL       Ext (70) 50%!       SB(45) 75% 25%!        Rotation (80) 75% 25%!           SHOULDER Flexion (180) 152 130!   ~170! (125!)     Abduction (180) 147 120!   ~170! (132!)     ER -0          ER -90 (90) T1 Occiput!   ~90! (70!)     IR -0          IR -90 (70) T9 L2!   15!      ELBOW Flex/biceps (140)          Ext/triceps (0)          Pronation (80)          Supination (80)             WRIST Flexion (60)          Extension (60)          RD (20)          UD (20)                         MMT L MMT R Notes     CERVICAL Cerv flexion       Cerv extension       Cerv SB       Cerv rotation          SHOULDER Flexion 5 4!     Abduction 5 4!     ER -0       ER -90 5 4+     IR -0       IR -90 5 4+      ELBOW Flex/biceps 5 5     Ext/triceps 5 5     Pronation       supination          WRIST Flexion 5 5     Extension 5 5     RD       UD       fingers 5 5

## 2024-08-19 ENCOUNTER — TREATMENT (OUTPATIENT)
Dept: PHYSICAL THERAPY | Age: 64
End: 2024-08-19
Payer: MEDICARE

## 2024-08-19 DIAGNOSIS — M50.30 DEGENERATION OF CERVICAL INTERVERTEBRAL DISC: ICD-10-CM

## 2024-08-19 DIAGNOSIS — M19.011 OSTEOARTHRITIS OF RIGHT SHOULDER, UNSPECIFIED OSTEOARTHRITIS TYPE: ICD-10-CM

## 2024-08-19 DIAGNOSIS — G89.29 CHRONIC RIGHT SHOULDER PAIN: Primary | ICD-10-CM

## 2024-08-19 DIAGNOSIS — M54.2 NECK PAIN: ICD-10-CM

## 2024-08-19 DIAGNOSIS — M25.511 CHRONIC RIGHT SHOULDER PAIN: Primary | ICD-10-CM

## 2024-08-19 DIAGNOSIS — M54.12 RADICULAR SYNDROME OF UPPER LIMBS: ICD-10-CM

## 2024-08-19 PROCEDURE — 97112 NEUROMUSCULAR REEDUCATION: CPT | Performed by: PHYSICAL THERAPIST

## 2024-08-19 PROCEDURE — 97140 MANUAL THERAPY 1/> REGIONS: CPT | Performed by: PHYSICAL THERAPIST

## 2024-08-19 PROCEDURE — 97110 THERAPEUTIC EXERCISES: CPT | Performed by: PHYSICAL THERAPIST

## 2024-08-19 NOTE — PROGRESS NOTES
[facility]    [address] [place] [address]   P:[phone]  F:[phone]  Physical Therapy Re-Certification Plan of Care    Dear Dev Guevara MD  ,    We had the pleasure of treating the following patient for physical therapy services at [facility]. A summary of our findings can be found in the updated assessment below.  This includes our plan of care.  If you have any questions or concerns regarding these findings, please do not hesitate to contact me at the office phone number checked above.  Thank you for the referral.     Physician Signature:________________________________Date:__________________  By signing above (or electronic signature), therapist's plan is approved by physician      Functional Outcome: quick DASH 16; NDI 5/ 10%  Charlie An 1960 continues to present with functional deficits in ROM and joint mobility  limiting ability with reaching overhead and turning head for driving safely and w/o pain  .  During therapy this date, patient required muscle facilitation and manual interventions for improving proper muscle recruitment and activation/motor control patterns, increasing ROM, improving soft tissue extensibility, and allowing for proper ROM. Patient will continue to benefit from ongoing evaluation and advanced clinical decision from a Physical Therapist to improve ROM, neuromuscular control, and functional mobility to safely return to OF without symptoms or restrictions.    Overall Response to Treatment:  Patient is responding well to treatment and improvement is noted with regards to goals. Pt is focusing on posterior shoulder and upper back strengthening at gym w/  and minimizing chest program. ROM has improved but mod tightness in neck and upper and R shoulder. Goal is to focus on inc motion and dynamic control through ranges for functional use. Pain is localized to one spot

## 2024-08-21 ENCOUNTER — TREATMENT (OUTPATIENT)
Dept: PHYSICAL THERAPY | Age: 64
End: 2024-08-21
Payer: MEDICARE

## 2024-08-21 DIAGNOSIS — M25.511 CHRONIC RIGHT SHOULDER PAIN: Primary | ICD-10-CM

## 2024-08-21 DIAGNOSIS — G89.29 CHRONIC RIGHT SHOULDER PAIN: Primary | ICD-10-CM

## 2024-08-21 DIAGNOSIS — M50.30 DEGENERATION OF CERVICAL INTERVERTEBRAL DISC: ICD-10-CM

## 2024-08-21 DIAGNOSIS — M54.12 RADICULAR SYNDROME OF UPPER LIMBS: ICD-10-CM

## 2024-08-21 DIAGNOSIS — M19.011 OSTEOARTHRITIS OF RIGHT SHOULDER, UNSPECIFIED OSTEOARTHRITIS TYPE: ICD-10-CM

## 2024-08-21 DIAGNOSIS — M54.2 NECK PAIN: ICD-10-CM

## 2024-08-21 PROCEDURE — 97140 MANUAL THERAPY 1/> REGIONS: CPT | Performed by: PHYSICAL THERAPIST

## 2024-08-21 PROCEDURE — 97110 THERAPEUTIC EXERCISES: CPT | Performed by: PHYSICAL THERAPIST

## 2024-08-21 PROCEDURE — 97112 NEUROMUSCULAR REEDUCATION: CPT | Performed by: PHYSICAL THERAPIST

## 2024-08-21 NOTE — PROGRESS NOTES
[facility]    [address] [place] [address]   P:[phone]  F:[phone]      Physical Therapy: TREATMENT/PROGRESS NOTE   Patient: Charlie An (64 y.o. male)   Examination Date: 2024   :  1960 MRN: [id number]   Visit #: 8   Insurance Allowable Auth Needed   BMN []Yes    []No    Insurance: Payor: CIGNA MEDICARE / Plan: 3 day [employer] / Product Type: *No Product type* /   Insurance ID: [id number] - (Medicare Managed)  Secondary Insurance (if applicable):    Treatment Diagnosis: shoulder pain, cervical pain   Medical Diagnosis:  Right shoulder pain, unspecified chronicity [M25.511];Neck pain [M54.2];DDD (degenerative disc disease), cervical [M50.30];Cervical radiculopathy [M54.12];Primary osteoarthritis of right shoulder [M19.011]   Referring Physician: Dev Guevara MD  PCP: Mukund Spencer MD     Plan of care signed (Y/N): sent 24, sent 24    Date of Patient follow up with Physician:      Progress Report/POC: NO  POC update due: (10 visits /OR AUTH LIMITS, whichever is less)  2024                                             Precautions/ Contra-indications:           Latex allergy:  NO  Pacemaker:    YES  Contraindications for Manipulation: None  Date of Surgery: NA  Other:    Red Flags:  None    C-SSRS Triggered by Intake questionnaire:   Patient answered 'NO' to both behavioral questions on intake.  No further screening warranted    Preferred Language for Healthcare:   [x] English       [] other:    SUBJECTIVE EXAMINATION     Patient stated complaint: Patient reports that he had symptoms radiating into R UE after forward flexion stretch of trunk and Ue's. He modified stretch and symptoms lessened but did not resolve. Symptoms in R hand noted today on arrival. Pt able to reach across body for shower that he hasn't been able to do in awhile.      Test used Initial score  2024   Pain Summary VAS

## 2024-08-26 ENCOUNTER — TREATMENT (OUTPATIENT)
Dept: PHYSICAL THERAPY | Age: 64
End: 2024-08-26
Payer: MEDICARE

## 2024-08-26 DIAGNOSIS — M19.011 OSTEOARTHRITIS OF RIGHT SHOULDER, UNSPECIFIED OSTEOARTHRITIS TYPE: ICD-10-CM

## 2024-08-26 DIAGNOSIS — M54.2 NECK PAIN: ICD-10-CM

## 2024-08-26 DIAGNOSIS — M50.30 DEGENERATION OF CERVICAL INTERVERTEBRAL DISC: ICD-10-CM

## 2024-08-26 DIAGNOSIS — M54.12 RADICULAR SYNDROME OF UPPER LIMBS: ICD-10-CM

## 2024-08-26 DIAGNOSIS — G89.29 CHRONIC RIGHT SHOULDER PAIN: Primary | ICD-10-CM

## 2024-08-26 DIAGNOSIS — M25.511 CHRONIC RIGHT SHOULDER PAIN: Primary | ICD-10-CM

## 2024-08-26 PROCEDURE — 97110 THERAPEUTIC EXERCISES: CPT | Performed by: PHYSICAL THERAPIST

## 2024-08-26 PROCEDURE — 97140 MANUAL THERAPY 1/> REGIONS: CPT | Performed by: PHYSICAL THERAPIST

## 2024-08-26 PROCEDURE — 97530 THERAPEUTIC ACTIVITIES: CPT | Performed by: PHYSICAL THERAPIST

## 2024-08-26 NOTE — PROGRESS NOTES
[facility]    [address] [place] [address]   P:[phone]  F:[phone]      Physical Therapy: TREATMENT/PROGRESS NOTE   Patient: Charlie An (64 y.o. male)   Examination Date: 2024   :  1960 MRN: [id number]   Visit #: 9   Insurance Allowable Auth Needed   BMN []Yes    []No    Insurance: Payor: CIGNA MEDICARE / Plan: [employer] / Product Type: *No Product type* /   Insurance ID: [id number] - (Medicare Managed)  Secondary Insurance (if applicable):    Treatment Diagnosis: shoulder pain, cervical pain   Medical Diagnosis:  Right shoulder pain, unspecified chronicity [M25.511];Neck pain [M54.2];DDD (degenerative disc disease), cervical [M50.30];Cervical radiculopathy [M54.12];Primary osteoarthritis of right shoulder [M19.011]   Referring Physician: Dev Guevara MD  PCP: Mukund Spencer MD     Plan of care signed (Y/N): sent 24, sent 24    Date of Patient follow up with Physician:      Progress Report/POC: NO  POC update due: (10 visits /OR AUTH LIMITS, whichever is less)  2024                                             Precautions/ Contra-indications:           Latex allergy:  NO  Pacemaker:    YES  Contraindications for Manipulation: None  Date of Surgery: NA  Other:    Red Flags:  None    C-SSRS Triggered by Intake questionnaire:   Patient answered 'NO' to both behavioral questions on intake.  No further screening warranted    Preferred Language for Healthcare:   [x] English       [] other:    SUBJECTIVE EXAMINATION     Patient stated complaint: Patient reports that Sat was a bad day w/ symptoms into both hands. Pain was not radiating into hands but from forearm into hands bilat. Pt was driving a lot on Friday. His symptoms were better yesterday and even better today.      Test used Initial score  2024   Pain Summary VAS 4/10 1/10   Functional questionnaire Quick DASH  NDI 31  7/14%  modulating pain, promoting relaxation,  increasing ROM, reducing/eliminating soft tissue swelling/inflammation/restriction, improving soft tissue extensibility and allowing for proper ROM for normal function with self care, mobility, lifting and ambulation    GOALS     Patient stated goal: no pain and full use of R UE  [] Progressing: [] Met: [] Not Met: [] Adjusted    Therapist goals for Patient:   Short Term Goals: To be achieved in: 2 weeks  1. Independent in HEP and progression per patient tolerance, in order to prevent re-injury.   [] Progressing: [] Met: [] Not Met: [] Adjusted  2. Patient will have a decrease in pain to <2/10 to facilitate improvement in movement, function, and ADLs as indicated by Functional Deficits.  [] Progressing: [] Met: [] Not Met: [] Adjusted    Long Term Goals: To be achieved in: 12 weeks  1. Disability index score of 10% or less for the Quick DASH to assist with reaching prior level of function with activities such as ADL's.  [] Progressing: [] Met: [] Not Met: [] Adjusted  2. Patient will demonstrate increased AROM of neck and R shoulder to WFL's without pain to allow for proper joint functioning to enable patient to safe driving and putting groceries away.   [] Progressing: [] Met: [] Not Met: [] Adjusted  3. Patient will demonstrate increased Strength of R UE to at least 5/5 throughout without pain to allow for proper functional mobility to enable patient to return to  gym program.   [] Progressing: [] Met: [] Not Met: [] Adjusted  4. Patient will return to all activities to include lifting wts without increased symptoms or restriction.   [] Progressing: [] Met: [] Not Met: [] Adjusted  5. Pt will be able to sleep at night and perform overhead activities w/o deviations and pain. (patient specific functional goal)    [] Progressing: [] Met: [] Not Met: [] Adjusted     Overall Progression Towards Functional goals/ Treatment Progress Update:  [] Patient is progressing as expected

## 2024-08-28 ENCOUNTER — TREATMENT (OUTPATIENT)
Dept: PHYSICAL THERAPY | Age: 64
End: 2024-08-28
Payer: MEDICARE

## 2024-08-28 DIAGNOSIS — M25.511 CHRONIC RIGHT SHOULDER PAIN: Primary | ICD-10-CM

## 2024-08-28 DIAGNOSIS — M54.12 RADICULAR SYNDROME OF UPPER LIMBS: ICD-10-CM

## 2024-08-28 DIAGNOSIS — M50.30 DEGENERATION OF CERVICAL INTERVERTEBRAL DISC: ICD-10-CM

## 2024-08-28 DIAGNOSIS — M19.011 OSTEOARTHRITIS OF RIGHT SHOULDER, UNSPECIFIED OSTEOARTHRITIS TYPE: ICD-10-CM

## 2024-08-28 DIAGNOSIS — G89.29 CHRONIC RIGHT SHOULDER PAIN: Primary | ICD-10-CM

## 2024-08-28 DIAGNOSIS — M54.2 NECK PAIN: ICD-10-CM

## 2024-08-28 PROCEDURE — 97140 MANUAL THERAPY 1/> REGIONS: CPT | Performed by: PHYSICAL THERAPIST

## 2024-08-28 PROCEDURE — 97530 THERAPEUTIC ACTIVITIES: CPT | Performed by: PHYSICAL THERAPIST

## 2024-08-28 PROCEDURE — 97110 THERAPEUTIC EXERCISES: CPT | Performed by: PHYSICAL THERAPIST

## 2024-08-28 NOTE — PROGRESS NOTES
[facility]    [address] [place] [address]   P:[phone]  F:[phone]      Physical Therapy: TREATMENT/PROGRESS NOTE   Patient: Charlie An (64 y.o. male)   Examination Date: 2024   :  1960 MRN: [id number]   Visit #: 10   Insurance Allowable Auth Needed   BMN []Yes    []No    Insurance: Payor: CIGNA MEDICARE / Plan: [employer] / Product Type: *No Product type* /   Insurance ID: [id number] - (Medicare Managed)  Secondary Insurance (if applicable):    Treatment Diagnosis: shoulder pain, cervical pain   Medical Diagnosis:  Right shoulder pain, unspecified chronicity [M25.511];Neck pain [M54.2];DDD (degenerative disc disease), cervical [M50.30];Cervical radiculopathy [M54.12];Primary osteoarthritis of right shoulder [M19.011]   Referring Physician: Dev Guevara MD  PCP: Mukund Spencer MD     Plan of care signed (Y/N): sent 24, sent 24    Date of Patient follow up with Physician:      Progress Report/POC: NO  POC update due: (10 visits /OR AUTH LIMITS, whichever is less)  2024                                             Precautions/ Contra-indications:           Latex allergy:  NO  Pacemaker:    YES  Contraindications for Manipulation: None  Date of Surgery: NA  Other:    Red Flags:  None    C-SSRS Triggered by Intake questionnaire:   Patient answered 'NO' to both behavioral questions on intake.  No further screening warranted    Preferred Language for Healthcare:   [x] English       [] other:    SUBJECTIVE EXAMINATION     Patient stated complaint: Patient stated that he is waking up at 4:30 AM w/ both hands tingling and numb. Overall, he is no longer having nerve symptoms in hands. He has been working out w/  and more focus on upper back. Pt drove today for ~1 hr but had 3 stops so getting out of car 3x's w/o any symptoms noted. He has been paying more attention to posture. He was able to use  one on one contact, billed in 15-minute increments.  (23454) MANUAL THERAPY -  Manual therapy techniques, 1 or more regions, each 15 minutes (Mobilization/manipulation, manual lymphatic drainage, manual traction) for the purpose of modulating pain, promoting relaxation,  increasing ROM, reducing/eliminating soft tissue swelling/inflammation/restriction, improving soft tissue extensibility and allowing for proper ROM for normal function with self care, mobility, lifting and ambulation    GOALS     Patient stated goal: no pain and full use of R UE  [] Progressing: [] Met: [] Not Met: [] Adjusted    Therapist goals for Patient:   Short Term Goals: To be achieved in: 2 weeks  1. Independent in HEP and progression per patient tolerance, in order to prevent re-injury.   [] Progressing: [] Met: [] Not Met: [] Adjusted  2. Patient will have a decrease in pain to <2/10 to facilitate improvement in movement, function, and ADLs as indicated by Functional Deficits.  [] Progressing: [] Met: [] Not Met: [] Adjusted    Long Term Goals: To be achieved in: 12 weeks  1. Disability index score of 10% or less for the Quick DASH to assist with reaching prior level of function with activities such as ADL's.  [] Progressing: [] Met: [] Not Met: [] Adjusted  2. Patient will demonstrate increased AROM of neck and R shoulder to WFL's without pain to allow for proper joint functioning to enable patient to safe driving and putting groceries away.   [] Progressing: [] Met: [] Not Met: [] Adjusted  3. Patient will demonstrate increased Strength of R UE to at least 5/5 throughout without pain to allow for proper functional mobility to enable patient to return to  gym program.   [] Progressing: [] Met: [] Not Met: [] Adjusted  4. Patient will return to all activities to include lifting wts without increased symptoms or restriction.   [] Progressing: [] Met: [] Not Met: [] Adjusted  5. Pt will be able to sleep at night and perform overhead

## 2024-09-06 ENCOUNTER — TREATMENT (OUTPATIENT)
Dept: PHYSICAL THERAPY | Age: 64
End: 2024-09-06

## 2024-09-06 DIAGNOSIS — M54.2 NECK PAIN: ICD-10-CM

## 2024-09-06 DIAGNOSIS — M19.011 OSTEOARTHRITIS OF RIGHT SHOULDER, UNSPECIFIED OSTEOARTHRITIS TYPE: ICD-10-CM

## 2024-09-06 DIAGNOSIS — M54.12 RADICULAR SYNDROME OF UPPER LIMBS: ICD-10-CM

## 2024-09-06 DIAGNOSIS — M25.511 CHRONIC RIGHT SHOULDER PAIN: Primary | ICD-10-CM

## 2024-09-06 DIAGNOSIS — M50.30 DEGENERATION OF CERVICAL INTERVERTEBRAL DISC: ICD-10-CM

## 2024-09-06 DIAGNOSIS — G89.29 CHRONIC RIGHT SHOULDER PAIN: Primary | ICD-10-CM

## 2024-09-06 NOTE — PROGRESS NOTES
[facility]    [address] [place] [address]   P:[phone]  F:[phone]      Physical Therapy: TREATMENT/PROGRESS NOTE   Patient: Charlie An (64 y.o. male)   Examination Date: 2024   :  1960 MRN: [id number]   Visit #: 11   Insurance Allowable Auth Needed   BMN []Yes    []No    Insurance: Payor: CIGNA MEDICARE / Plan: [employer] / Product Type: *No Product type* /   Insurance ID: [id number] - (Medicare Managed)  Secondary Insurance (if applicable):    Treatment Diagnosis: shoulder pain, cervical pain   Medical Diagnosis:  Right shoulder pain, unspecified chronicity [M25.511];Neck pain [M54.2];DDD (degenerative disc disease), cervical [M50.30];Cervical radiculopathy [M54.12];Primary osteoarthritis of right shoulder [M19.011]   Referring Physician: Dev Guevara MD  PCP: Mukund Spencer MD     Plan of care signed (Y/N): sent 24, sent 24    Date of Patient follow up with Physician:      Progress Report/POC: NO  POC update due: (10 visits /OR AUTH LIMITS, whichever is less)  10/4/2024                                             Precautions/ Contra-indications:           Latex allergy:  NO  Pacemaker:    YES  Contraindications for Manipulation: None  Date of Surgery: NA  Other:    Red Flags:  None    C-SSRS Triggered by Intake questionnaire:   Patient answered 'NO' to both behavioral questions on intake.  No further screening warranted    Preferred Language for Healthcare:   [x] English       [] other:    SUBJECTIVE EXAMINATION     Patient stated complaint: Patient stated that his primary compliant is numbness and tingling in both hands. Otherwise, he is not having any other issues. N/t in UE resolves as the day progresses. He did try wearing a brace at night and when at gym that has been helpful.      Test used Initial score  2024   Pain Summary VAS 4/10 1/10   Functional questionnaire Quick  Patient still having severe stomach pain. Patient's daughter would like to discuss treatment option

## 2024-09-09 ENCOUNTER — TREATMENT (OUTPATIENT)
Dept: PHYSICAL THERAPY | Age: 64
End: 2024-09-09
Payer: MEDICARE

## 2024-09-09 DIAGNOSIS — G89.29 CHRONIC RIGHT SHOULDER PAIN: Primary | ICD-10-CM

## 2024-09-09 DIAGNOSIS — M54.12 RADICULAR SYNDROME OF UPPER LIMBS: ICD-10-CM

## 2024-09-09 DIAGNOSIS — M25.511 CHRONIC RIGHT SHOULDER PAIN: Primary | ICD-10-CM

## 2024-09-09 DIAGNOSIS — M54.2 NECK PAIN: ICD-10-CM

## 2024-09-09 DIAGNOSIS — M50.30 DEGENERATION OF CERVICAL INTERVERTEBRAL DISC: ICD-10-CM

## 2024-09-09 DIAGNOSIS — M19.011 OSTEOARTHRITIS OF RIGHT SHOULDER, UNSPECIFIED OSTEOARTHRITIS TYPE: ICD-10-CM

## 2024-09-09 PROCEDURE — 97530 THERAPEUTIC ACTIVITIES: CPT | Performed by: PHYSICAL THERAPIST

## 2024-09-09 PROCEDURE — 97110 THERAPEUTIC EXERCISES: CPT | Performed by: PHYSICAL THERAPIST

## 2024-09-09 PROCEDURE — 97140 MANUAL THERAPY 1/> REGIONS: CPT | Performed by: PHYSICAL THERAPIST

## 2024-09-11 ENCOUNTER — TREATMENT (OUTPATIENT)
Dept: PHYSICAL THERAPY | Age: 64
End: 2024-09-11
Payer: MEDICARE

## 2024-09-11 DIAGNOSIS — M54.2 NECK PAIN: ICD-10-CM

## 2024-09-11 DIAGNOSIS — M19.011 OSTEOARTHRITIS OF RIGHT SHOULDER, UNSPECIFIED OSTEOARTHRITIS TYPE: ICD-10-CM

## 2024-09-11 DIAGNOSIS — M54.12 RADICULAR SYNDROME OF UPPER LIMBS: ICD-10-CM

## 2024-09-11 DIAGNOSIS — G89.29 CHRONIC RIGHT SHOULDER PAIN: Primary | ICD-10-CM

## 2024-09-11 DIAGNOSIS — M25.511 CHRONIC RIGHT SHOULDER PAIN: Primary | ICD-10-CM

## 2024-09-11 DIAGNOSIS — M50.30 DEGENERATION OF CERVICAL INTERVERTEBRAL DISC: ICD-10-CM

## 2024-09-11 PROCEDURE — 97140 MANUAL THERAPY 1/> REGIONS: CPT | Performed by: PHYSICAL THERAPIST

## 2024-09-11 PROCEDURE — 97530 THERAPEUTIC ACTIVITIES: CPT | Performed by: PHYSICAL THERAPIST

## 2024-09-11 PROCEDURE — 97110 THERAPEUTIC EXERCISES: CPT | Performed by: PHYSICAL THERAPIST

## 2024-09-18 ENCOUNTER — HOSPITAL ENCOUNTER (OUTPATIENT)
Dept: PHYSICAL THERAPY | Age: 64
Setting detail: THERAPIES SERIES
Discharge: HOME | End: 2024-09-18
Payer: MEDICARE

## 2024-09-18 PROCEDURE — 97140 MANUAL THERAPY 1/> REGIONS: CPT | Performed by: PHYSICAL THERAPIST

## 2024-09-18 PROCEDURE — 97110 THERAPEUTIC EXERCISES: CPT | Performed by: PHYSICAL THERAPIST

## 2024-09-18 PROCEDURE — 97012 MECHANICAL TRACTION THERAPY: CPT | Performed by: PHYSICAL THERAPIST

## 2024-09-25 ENCOUNTER — HOSPITAL ENCOUNTER (OUTPATIENT)
Dept: PHYSICAL THERAPY | Age: 64
Setting detail: THERAPIES SERIES
Discharge: HOME | End: 2024-09-25
Payer: MEDICARE

## 2024-09-25 PROCEDURE — 97012 MECHANICAL TRACTION THERAPY: CPT | Performed by: PHYSICAL THERAPIST

## 2024-09-25 PROCEDURE — 97140 MANUAL THERAPY 1/> REGIONS: CPT | Performed by: PHYSICAL THERAPIST

## 2024-09-25 PROCEDURE — 97110 THERAPEUTIC EXERCISES: CPT | Performed by: PHYSICAL THERAPIST

## 2024-09-25 RX ORDER — ATORVASTATIN CALCIUM 40 MG/1
40 TABLET, FILM COATED ORAL DAILY
Qty: 90 TABLET | Refills: 0 | Status: SHIPPED | OUTPATIENT
Start: 2024-09-25

## 2024-09-27 ENCOUNTER — HOSPITAL ENCOUNTER (OUTPATIENT)
Dept: PHYSICAL THERAPY | Age: 64
Setting detail: THERAPIES SERIES
Discharge: HOME | End: 2024-09-27
Payer: MEDICARE

## 2024-09-27 PROCEDURE — 97012 MECHANICAL TRACTION THERAPY: CPT | Performed by: PHYSICAL THERAPIST

## 2024-09-27 PROCEDURE — 97140 MANUAL THERAPY 1/> REGIONS: CPT | Performed by: PHYSICAL THERAPIST

## 2024-09-27 PROCEDURE — 97110 THERAPEUTIC EXERCISES: CPT | Performed by: PHYSICAL THERAPIST

## 2024-10-02 ENCOUNTER — OFFICE VISIT (OUTPATIENT)
Dept: ORTHOPEDIC SURGERY | Age: 64
End: 2024-10-02
Payer: MEDICARE

## 2024-10-02 VITALS — BODY MASS INDEX: 30.06 KG/M2 | WEIGHT: 210 LBS | HEIGHT: 70 IN

## 2024-10-02 DIAGNOSIS — M47.22 CERVICAL SPONDYLOSIS WITH RADICULOPATHY: ICD-10-CM

## 2024-10-02 DIAGNOSIS — M50.30 DDD (DEGENERATIVE DISC DISEASE), CERVICAL: Primary | ICD-10-CM

## 2024-10-02 PROCEDURE — 99214 OFFICE O/P EST MOD 30 MIN: CPT | Performed by: PHYSICIAN ASSISTANT

## 2024-10-02 PROCEDURE — 1123F ACP DISCUSS/DSCN MKR DOCD: CPT | Performed by: PHYSICIAN ASSISTANT

## 2024-10-02 RX ORDER — PREDNISONE 20 MG/1
TABLET ORAL
Qty: 20 TABLET | Refills: 0 | Status: SHIPPED | OUTPATIENT
Start: 2024-10-02

## 2024-10-02 NOTE — PROGRESS NOTES
New Patient: CERVICAL SPINE    Referring Provider:  No ref. provider found    CHIEF COMPLAINT:    Chief Complaint   Patient presents with    Neck Pain     Cervical        HISTORY OF PRESENT ILLNESS:   Mr. Charlie An is a pleasant 64 y.o. old male here for evaluation regarding his neck and right arm pain.  Since the patient was last seen at the end of July he has been involved in outpatient physical therapy with some benefit.  Patient states recently has had increased pain over the right side of his neck with radiation of pain into his right upper extremity into his hand and his C5-6 distribution.  He does have associated numbness and tingling into the right upper extremity.  He states that his current pain is 10/10.  Pain Assessment  Location of Pain: Neck  Location Modifiers: Other (Comment)  Severity of Pain: 10  Quality of Pain: Other (Comment)  Duration of Pain: Persistent  Frequency of Pain: Constant]      7/24/2024   He states the pain began insidiously about 3 months ago.  He states that he initially seen Dr. Guevara for his right shoulder who did identify that he had arthritis in his neck and referred him to therapy.  He states since then he has had intermittent pain which she rates 1-2/10 within his cervical spine and 1-2/10 within the right shoulder.  He states that the radicular pain into the right upper extremity has diminished.  He states that he does at times have difficulty with sleeping but has had no ongoing pain.  He does occasionally have numbness and tingling into the right hand but denies any weakness,  strength deficit, or difficulty with fine motor movements.  He is currently enrolled in physical therapy and has been taking Naprosyn as needed.     Current/Past Treatment:   Physical Therapy: Currently with benefit  Chiropractic: In the past with no durable benefit  Injection: None  Medications: Naprosyn as needed    Past Medical History:   Past Medical History:   Diagnosis Date

## 2024-10-09 ENCOUNTER — TELEPHONE (OUTPATIENT)
Dept: ORTHOPEDIC SURGERY | Age: 64
End: 2024-10-09

## 2024-10-09 NOTE — TELEPHONE ENCOUNTER
General Question     Subject:CLAUSTROPHOBIC MEDICATION  Patient and /or Facility Request: Charlie An   Contact Number: [phone]      PATIENT CALLED REGARDING HIS MRI HE HAS SCHEDULED ON 10/14/24    HE IS REQ TO SPEAK WITH [facility] PERTAINING TO LUZ PARKER PRESCRIBING HIM CLAUSTROPHOBIA MEDICATION FOR HIS MRI APPT    IF ABLE TO BE PRESCRIBED,HE IS ASKING FOR THIS TO BE SENT TO [facility] ON [facility]    PLEASE CALL PATIENT BACK AT THE ABOVE NUMBER TO FURTHER ASSIST

## 2024-10-09 NOTE — TELEPHONE ENCOUNTER
Medical Facility Question     Facility Name: [facility]  Contact Name:   Contact Number:   Request or Information: REQUEST FOR AN ORDER FOR CHEST XRAY WAS MADE BY [facility] BECAUSE PATIENT HAS A PACEMAKER RADIOLOGY REQUESTED A CHEST XRAY BEFORE MRI

## 2024-10-10 ENCOUNTER — TELEPHONE (OUTPATIENT)
Dept: ORTHOPEDIC SURGERY | Age: 64
End: 2024-10-10

## 2024-10-10 DIAGNOSIS — M47.22 CERVICAL SPONDYLOSIS WITH RADICULOPATHY: Primary | ICD-10-CM

## 2024-10-10 RX ORDER — LORAZEPAM 1 MG/1
TABLET ORAL
Qty: 2 TABLET | Refills: 0 | Status: SHIPPED | OUTPATIENT
Start: 2024-10-10 | End: 2024-10-11

## 2024-10-10 NOTE — TELEPHONE ENCOUNTER
General Question     Subject: PAIN MEDICATION   Patient and /or Facility Request: Charlie An   Contact Number: [phone]     PATIENT REQUESTING A CALL BACK FROM SOMEONE REGARDING PAIN MEDICATION. PATIENT STATES THAT THE MEDICATION HE'S CURRENTLY TAKING IS NOT HELPING AT ALL AND WANTS TO KNOW IF HE CAN GET SOMETHING DIFFERENT TO MANAGE THE PAIN     PLEASE CALL THE PATIENT BACK AT THE ABOVE NUMBER

## 2024-10-14 ENCOUNTER — HOSPITAL ENCOUNTER (OUTPATIENT)
Dept: MRI IMAGING | Age: 64
Discharge: HOME | End: 2024-10-14
Payer: MEDICARE

## 2024-10-14 ENCOUNTER — HOSPITAL ENCOUNTER (OUTPATIENT)
Dept: GENERAL RADIOLOGY | Age: 64
Discharge: HOME | End: 2024-10-14
Payer: MEDICARE

## 2024-10-14 DIAGNOSIS — Z92.89 HISTORY OF MRI OF CERVICAL SPINE: ICD-10-CM

## 2024-10-14 DIAGNOSIS — M47.22 CERVICAL SPONDYLOSIS WITH RADICULOPATHY: ICD-10-CM

## 2024-10-14 DIAGNOSIS — M50.30 DDD (DEGENERATIVE DISC DISEASE), CERVICAL: ICD-10-CM

## 2024-10-14 PROCEDURE — 93286 PERI-PX EVAL PM/LDLS PM IP: CPT

## 2024-10-14 PROCEDURE — 71046 X-RAY EXAM CHEST 2 VIEWS: CPT

## 2024-10-18 ENCOUNTER — TELEPHONE (OUTPATIENT)
Dept: ORTHOPEDIC SURGERY | Age: 64
End: 2024-10-18

## 2024-10-18 NOTE — TELEPHONE ENCOUNTER
Test Results     Type of Test: MRI OF NECK  Date of Test:   Location of Test: [facility]  Patient Contact Number: [phone]     THE PT WOULD LIKE A CALL BACK REGARDING HIS MRI RESULTS AND WHAT THE NEXT STEPS ARE

## 2024-10-21 ENCOUNTER — OFFICE VISIT (OUTPATIENT)
Dept: PRIMARY CARE CLINIC | Age: 64
End: 2024-10-21
Payer: MEDICARE

## 2024-10-21 VITALS
HEIGHT: 70 IN | TEMPERATURE: 97 F | OXYGEN SATURATION: 99 % | BODY MASS INDEX: 29.63 KG/M2 | HEART RATE: 67 BPM | WEIGHT: 207 LBS | SYSTOLIC BLOOD PRESSURE: 145 MMHG | DIASTOLIC BLOOD PRESSURE: 88 MMHG

## 2024-10-21 DIAGNOSIS — I42.8 NONISCHEMIC CARDIOMYOPATHY (HCC): ICD-10-CM

## 2024-10-21 DIAGNOSIS — F41.1 GAD (GENERALIZED ANXIETY DISORDER): ICD-10-CM

## 2024-10-21 DIAGNOSIS — I10 ESSENTIAL HYPERTENSION: ICD-10-CM

## 2024-10-21 DIAGNOSIS — Z95.0 S/P PLACEMENT OF CARDIAC PACEMAKER: ICD-10-CM

## 2024-10-21 DIAGNOSIS — E78.5 HYPERLIPIDEMIA LDL GOAL <100: ICD-10-CM

## 2024-10-21 DIAGNOSIS — E87.1 CHRONIC HYPONATREMIA: ICD-10-CM

## 2024-10-21 DIAGNOSIS — I44.1 MOBITZ TYPE II ATRIOVENTRICULAR BLOCK: ICD-10-CM

## 2024-10-21 DIAGNOSIS — M50.10 CERVICAL DISC DISORDER WITH RADICULOPATHY: Primary | ICD-10-CM

## 2024-10-21 PROCEDURE — 3077F SYST BP >= 140 MM HG: CPT | Performed by: FAMILY MEDICINE

## 2024-10-21 PROCEDURE — 3079F DIAST BP 80-89 MM HG: CPT | Performed by: FAMILY MEDICINE

## 2024-10-21 PROCEDURE — 1123F ACP DISCUSS/DSCN MKR DOCD: CPT | Performed by: FAMILY MEDICINE

## 2024-10-21 PROCEDURE — 99214 OFFICE O/P EST MOD 30 MIN: CPT | Performed by: FAMILY MEDICINE

## 2024-10-21 RX ORDER — ASPIRIN 81 MG/1
81 TABLET ORAL DAILY
COMMUNITY
Start: 2024-10-21

## 2024-10-21 SDOH — ECONOMIC STABILITY: INCOME INSECURITY: HOW HARD IS IT FOR YOU TO PAY FOR THE VERY BASICS LIKE FOOD, HOUSING, MEDICAL CARE, AND HEATING?: NOT VERY HARD

## 2024-10-21 SDOH — ECONOMIC STABILITY: FOOD INSECURITY: WITHIN THE PAST 12 MONTHS, YOU WORRIED THAT YOUR FOOD WOULD RUN OUT BEFORE YOU GOT MONEY TO BUY MORE.: NEVER TRUE

## 2024-10-21 SDOH — ECONOMIC STABILITY: FOOD INSECURITY: WITHIN THE PAST 12 MONTHS, THE FOOD YOU BOUGHT JUST DIDN'T LAST AND YOU DIDN'T HAVE MONEY TO GET MORE.: NEVER TRUE

## 2024-10-21 ASSESSMENT — ENCOUNTER SYMPTOMS
BACK PAIN: 1
ABDOMINAL PAIN: 0
SHORTNESS OF BREATH: 0
CONSTIPATION: 0
DIARRHEA: 0
VOICE CHANGE: 0
TROUBLE SWALLOWING: 0
BLOOD IN STOOL: 0

## 2024-10-21 ASSESSMENT — PATIENT HEALTH QUESTIONNAIRE - PHQ9
SUM OF ALL RESPONSES TO PHQ9 QUESTIONS 1 & 2: 0
SUM OF ALL RESPONSES TO PHQ QUESTIONS 1-9: 0
SUM OF ALL RESPONSES TO PHQ QUESTIONS 1-9: 0
1. LITTLE INTEREST OR PLEASURE IN DOING THINGS: NOT AT ALL
SUM OF ALL RESPONSES TO PHQ QUESTIONS 1-9: 0
SUM OF ALL RESPONSES TO PHQ QUESTIONS 1-9: 0
2. FEELING DOWN, DEPRESSED OR HOPELESS: NOT AT ALL

## 2024-10-21 NOTE — PROGRESS NOTES
10/21/2024     Charlie An (:  1960) is a 64 y.o. male, here for evaluation of the following medical concerns:    Patient is 64 years old white male medical history significant for nonischemic cardiomyopathy, hypertension, Mobitz type II AV block, status post pacemaker placement, hyperlipidemia, general anxiety disorder, chronic hyponatremia and chronic neck and shoulder pain found to have cervical disc disorder with radiculopathy.  Patient goes to Dr. Guevara orthopedic at [facility] and with we will treated the patient conservatively.  He was then referred for physical therapy with modest improvement of symptoms however has not lingering neck and shoulder discomfort patient was then referred to [facility] back [facility] spine [facility] and patient has been seeing Yan been treating conservatively.  Patient has recent MRI of the cervical spine the result is not known to the patient but was told he will discuss the result with the orthopedic on this upcoming appointment and was also told that he may need a cortisone shot.      Review of Systems   Constitutional:  Negative for activity change.   HENT:  Negative for trouble swallowing and voice change.    Eyes:  Negative for visual disturbance.   Respiratory:  Negative for shortness of breath.    Cardiovascular:  Negative for chest pain and leg swelling.   Gastrointestinal:  Negative for abdominal pain, blood in stool, constipation and diarrhea.   Genitourinary:  Negative for difficulty urinating, dysuria, frequency, hematuria and scrotal swelling.   Musculoskeletal:  Positive for back pain and neck pain. Negative for arthralgias and myalgias.   Skin:  Negative for rash.   Neurological:  Negative for dizziness.   Psychiatric/Behavioral:  Negative for behavioral problems.        Prior to Visit Medications    Medication Sig Taking? Authorizing Provider   aspirin 81 MG EC tablet Take 1 tablet by mouth daily Yes Mukund Spencer MD   atorvastatin

## 2024-10-23 ENCOUNTER — OFFICE VISIT (OUTPATIENT)
Dept: ORTHOPEDIC SURGERY | Age: 64
End: 2024-10-23
Payer: MEDICARE

## 2024-10-23 VITALS — HEIGHT: 70 IN | BODY MASS INDEX: 29.63 KG/M2 | WEIGHT: 207 LBS

## 2024-10-23 DIAGNOSIS — M48.02 CERVICAL STENOSIS OF SPINE: Primary | ICD-10-CM

## 2024-10-23 PROCEDURE — 99214 OFFICE O/P EST MOD 30 MIN: CPT | Performed by: PHYSICIAN ASSISTANT

## 2024-10-23 PROCEDURE — 1123F ACP DISCUSS/DSCN MKR DOCD: CPT | Performed by: PHYSICIAN ASSISTANT

## 2024-10-23 NOTE — PROGRESS NOTES
Treatment:   Physical Therapy: Currently with benefit  Chiropractic: In the past with no durable benefit  Injection: None  Medications: Naprosyn as needed    Past Medical History:   Past Medical History:   Diagnosis Date    Allergic rhinitis     CAD (coronary artery disease)     saw in past 6 months and no issues    Cardiomyopathy (HCC)     resolved?    Colon polyp     Glenohumeral arthritis 2/17/2014    HTN (hypertension)     Hyperlipidemia     IBS (irritable bowel syndrome)     Low back pain     Lumbar disc herniation     Rectal lesion     Rotator cuff tear, left     Rotator cuff tendonitis 2/17/2014    Shoulder pain 2/17/2014        Past Surgical History:     Past Surgical History:   Procedure Laterality Date    COLONOSCOPY N/A 5/19/2022    COLORECTAL CANCER SCREENING, NOT HIGH RISK performed by Castillo Fuentes MD at [facility] ENDOSCOPY    ROTATOR CUFF REPAIR Left        Current Medications:     Current Outpatient Medications:     aspirin 81 MG EC tablet, Take 1 tablet by mouth daily, Disp: , Rfl:     predniSONE (DELTASONE) 20 MG tablet, Take 3 p.o. daily for 3 days, then 2 p.o. daily for 3 days, then 1 p.o. daily for 3 days, then one half p.o. daily for 4 days (Patient not taking: Reported on 10/21/2024), Disp: 20 tablet, Rfl: 0    atorvastatin (LIPITOR) 40 MG tablet, Take 1 tablet by mouth daily To the patient: Please call to make an appointment for fasting labs.  Phone: [phone]., Disp: 90 tablet, Rfl: 0    sacubitril-valsartan (ENTRESTO)  MG per tablet, Take 1 tablet by mouth 2 times daily, Disp: 180 tablet, Rfl: 1    metoprolol succinate (TOPROL XL) 100 MG extended release tablet, TAKE 1 TABLET BY MOUTH TWICE A DAY, Disp: 180 tablet, Rfl: 1    sildenafil (VIAGRA) 100 MG tablet, TAKE 1 TABLET BY MOUTH DAILY AS NEEDED FOR ERECTILE DYSFUNCTION, Disp: 30 tablet, Rfl: 3    ipratropium (ATROVENT) 0.06 % nasal spray, 2 sprays by Each Nostril route 4 times daily, Disp: 15 mL, Rfl: 3    fluticasone

## 2024-10-24 ENCOUNTER — TELEPHONE (OUTPATIENT)
Dept: ORTHOPEDIC SURGERY | Age: 64
End: 2024-10-24

## 2024-10-24 NOTE — TELEPHONE ENCOUNTER
Other    PATIENT CALLING REGARDING THE REF BY  PROVIDER FOR HIS NECK     PATIENT STATED THAT THE [facility] DO NOT EXCEPT HIS INS     PLEASE CALL PATIENT [phone]

## 2024-11-14 RX ORDER — METOPROLOL SUCCINATE 100 MG/1
100 TABLET, EXTENDED RELEASE ORAL 2 TIMES DAILY
Qty: 180 TABLET | Refills: 1 | Status: SHIPPED | OUTPATIENT
Start: 2024-11-14

## 2024-11-18 RX ORDER — SACUBITRIL AND VALSARTAN 97; 103 MG/1; MG/1
1 TABLET, FILM COATED ORAL 2 TIMES DAILY
Qty: 180 TABLET | Refills: 1 | Status: SHIPPED | OUTPATIENT
Start: 2024-11-18

## 2024-11-22 ENCOUNTER — OFFICE VISIT (OUTPATIENT)
Dept: PRIMARY CARE CLINIC | Age: 64
End: 2024-11-22
Payer: MEDICARE

## 2024-11-22 VITALS
BODY MASS INDEX: 30.56 KG/M2 | SYSTOLIC BLOOD PRESSURE: 147 MMHG | RESPIRATION RATE: 18 BRPM | WEIGHT: 213 LBS | OXYGEN SATURATION: 97 % | HEART RATE: 67 BPM | TEMPERATURE: 98.7 F | DIASTOLIC BLOOD PRESSURE: 88 MMHG

## 2024-11-22 DIAGNOSIS — J01.90 ACUTE RHINOSINUSITIS: Primary | ICD-10-CM

## 2024-11-22 DIAGNOSIS — I42.8 NONISCHEMIC CARDIOMYOPATHY (HCC): ICD-10-CM

## 2024-11-22 DIAGNOSIS — J31.0 CHRONIC RHINITIS: ICD-10-CM

## 2024-11-22 DIAGNOSIS — M50.10 CERVICAL DISC DISORDER WITH RADICULOPATHY: ICD-10-CM

## 2024-11-22 DIAGNOSIS — Z95.0 S/P PLACEMENT OF CARDIAC PACEMAKER: ICD-10-CM

## 2024-11-22 DIAGNOSIS — E87.1 CHRONIC HYPONATREMIA: ICD-10-CM

## 2024-11-22 DIAGNOSIS — I10 ESSENTIAL HYPERTENSION: ICD-10-CM

## 2024-11-22 DIAGNOSIS — I44.1 MOBITZ TYPE II ATRIOVENTRICULAR BLOCK: ICD-10-CM

## 2024-11-22 PROCEDURE — 3079F DIAST BP 80-89 MM HG: CPT | Performed by: FAMILY MEDICINE

## 2024-11-22 PROCEDURE — 1123F ACP DISCUSS/DSCN MKR DOCD: CPT | Performed by: FAMILY MEDICINE

## 2024-11-22 PROCEDURE — 99214 OFFICE O/P EST MOD 30 MIN: CPT | Performed by: FAMILY MEDICINE

## 2024-11-22 PROCEDURE — 3077F SYST BP >= 140 MM HG: CPT | Performed by: FAMILY MEDICINE

## 2024-11-22 RX ORDER — METHYLPREDNISOLONE 4 MG/1
TABLET ORAL
Qty: 1 KIT | Refills: 0 | Status: SHIPPED | OUTPATIENT
Start: 2024-11-22 | End: 2024-11-28

## 2024-11-22 RX ORDER — AMOXICILLIN 500 MG/1
500 CAPSULE ORAL 2 TIMES DAILY
Qty: 20 CAPSULE | Refills: 0 | Status: SHIPPED | OUTPATIENT
Start: 2024-11-22 | End: 2024-12-02

## 2024-11-22 RX ORDER — FEXOFENADINE HCL 180 MG/1
180 TABLET ORAL DAILY PRN
Qty: 30 TABLET | Refills: 1 | Status: SHIPPED | OUTPATIENT
Start: 2024-11-22 | End: 2024-12-22

## 2024-11-22 ASSESSMENT — ENCOUNTER SYMPTOMS
CONSTIPATION: 0
RHINORRHEA: 1
VOICE CHANGE: 0
DIARRHEA: 0
SHORTNESS OF BREATH: 0
SINUS PRESSURE: 1
COUGH: 1
TROUBLE SWALLOWING: 0
SINUS PAIN: 1
BLOOD IN STOOL: 0
ABDOMINAL PAIN: 0

## 2024-11-22 NOTE — PROGRESS NOTES
2024     Charlie An (:  1960) is a 64 y.o. male, here for evaluation of the following medical concerns:    Patient is 64 years old white male medical history significant for chronic rhinitis, chronic sinusitis, hypertension, nonischemic cardiomyopathy chronic hyponatremia, Mobitz type II AV block status post pacemaker placement and cervical disc disorder with radiculopathy.  He presented to the office for checkup.  He complain of nasal congestion, rhinorrhea headache pressure on the sinuses dry cough and body malaise.  He denies fever and chills denies shortness of breath or wheezing denies chest pain.  His blood pressure is somewhat elevated due to anxiety he takes Toprol-XL and Entresto.  He was on Lotensin which was discontinued when he was started on Entresto,.  Was also on amlodipine  He has a pacemaker placement due to AV block currently stable.  Cardiomyopathy is compensated no sign of volume overload.  He has cervical disc disease with radiculopathy goes to  pain management and reported to have steroid injection which helped his symptoms    Review of Systems   Constitutional:  Negative for activity change.   HENT:  Positive for congestion, rhinorrhea, sinus pressure and sinus pain. Negative for trouble swallowing and voice change.    Eyes:  Negative for visual disturbance.   Respiratory:  Positive for cough. Negative for shortness of breath.    Cardiovascular:  Negative for chest pain and leg swelling.   Gastrointestinal:  Negative for abdominal pain, blood in stool, constipation and diarrhea.   Genitourinary:  Negative for difficulty urinating, dysuria, frequency, hematuria and scrotal swelling.   Musculoskeletal:  Negative for arthralgias and myalgias.   Skin:  Negative for rash.   Neurological:  Negative for dizziness.   Psychiatric/Behavioral:  Negative for behavioral problems.        Prior to Visit Medications    Medication Sig Taking? Authorizing Provider   amoxicillin

## 2024-12-17 RX ORDER — ATORVASTATIN CALCIUM 40 MG/1
40 TABLET, FILM COATED ORAL DAILY
Qty: 90 TABLET | Refills: 1 | Status: SHIPPED | OUTPATIENT
Start: 2024-12-17

## 2024-12-30 ENCOUNTER — OFFICE VISIT (OUTPATIENT)
Dept: ORTHOPEDIC SURGERY | Age: 64
End: 2024-12-30
Payer: MEDICARE

## 2024-12-30 DIAGNOSIS — M19.011 PRIMARY OSTEOARTHRITIS OF RIGHT SHOULDER: Primary | ICD-10-CM

## 2024-12-30 PROCEDURE — 99214 OFFICE O/P EST MOD 30 MIN: CPT | Performed by: ORTHOPAEDIC SURGERY

## 2024-12-30 PROCEDURE — 1123F ACP DISCUSS/DSCN MKR DOCD: CPT | Performed by: ORTHOPAEDIC SURGERY

## 2024-12-30 PROCEDURE — 20610 DRAIN/INJ JOINT/BURSA W/O US: CPT | Performed by: ORTHOPAEDIC SURGERY

## 2024-12-30 RX ORDER — LIDOCAINE HYDROCHLORIDE 10 MG/ML
4 INJECTION, SOLUTION INFILTRATION; PERINEURAL ONCE
Status: COMPLETED | OUTPATIENT
Start: 2024-12-30 | End: 2024-12-30

## 2024-12-30 RX ORDER — METHYLPREDNISOLONE ACETATE 40 MG/ML
80 INJECTION, SUSPENSION INTRA-ARTICULAR; INTRALESIONAL; INTRAMUSCULAR; SOFT TISSUE ONCE
Status: COMPLETED | OUTPATIENT
Start: 2024-12-30 | End: 2024-12-30

## 2024-12-30 RX ADMIN — LIDOCAINE HYDROCHLORIDE 4 ML: 10 INJECTION, SOLUTION INFILTRATION; PERINEURAL at 16:38

## 2024-12-30 RX ADMIN — METHYLPREDNISOLONE ACETATE 80 MG: 40 INJECTION, SUSPENSION INTRA-ARTICULAR; INTRALESIONAL; INTRAMUSCULAR; SOFT TISSUE at 16:38

## 2024-12-30 NOTE — PROGRESS NOTES
Chief Complaint    Follow-up (Right shoulder )      History of Present Illness:  Charlie An is a 64 y.o. male here today for follow up evaluation of the right shoulder. He has known glenohumeral arthritis of the right shoulder. Treatment has included formal, supervised physical therapy and a prescription of meloxicam. He is unable to take the meloxicam due to side effects. He returns today with an exacerbation of symptoms after working out. He complains of constant dull pain, worse with physical activity.     Medical History:  Patient's medications, allergies, past medical, surgical, social and family histories were reviewed and updated as appropriate.    Pertinent items are noted in HPI  Review of systems reviewed from Patient History Form completed today and available in the patient's chart under the Media tab.            Past Medical History:   Diagnosis Date    Allergic rhinitis     CAD (coronary artery disease)     saw in past 6 months and no issues    Cardiomyopathy (HCC)     resolved?    Colon polyp     Glenohumeral arthritis 2/17/2014    HTN (hypertension)     Hyperlipidemia     IBS (irritable bowel syndrome)     Low back pain     Lumbar disc herniation     Rectal lesion     Rotator cuff tear, left     Rotator cuff tendonitis 2/17/2014    Shoulder pain 2/17/2014        Past Surgical History:   Procedure Laterality Date    COLONOSCOPY N/A 5/19/2022    COLORECTAL CANCER SCREENING, NOT HIGH RISK performed by Castillo Fuentes MD at [facility] ENDOSCOPY    ROTATOR CUFF REPAIR Left        No family history on file.    Social History     Socioeconomic History    Marital status:    Tobacco Use    Smoking status: Never    Smokeless tobacco: Never   Vaping Use    Vaping status: Never Used   Substance and Sexual Activity    Alcohol use: Yes     Alcohol/week: 6.0 standard drinks of alcohol     Types: 6 Cans of beer per week     Comment: daily    Drug use: No    Sexual activity: Yes     Partners: Female

## 2025-01-20 ENCOUNTER — OFFICE VISIT (OUTPATIENT)
Dept: ORTHOPEDIC SURGERY | Age: 65
End: 2025-01-20
Payer: MEDICARE

## 2025-01-20 VITALS — BODY MASS INDEX: 30.06 KG/M2 | HEIGHT: 70 IN | WEIGHT: 210 LBS

## 2025-01-20 DIAGNOSIS — M19.011 PRIMARY OSTEOARTHRITIS OF RIGHT SHOULDER: Primary | ICD-10-CM

## 2025-01-20 PROCEDURE — 99214 OFFICE O/P EST MOD 30 MIN: CPT | Performed by: ORTHOPAEDIC SURGERY

## 2025-01-20 PROCEDURE — 1123F ACP DISCUSS/DSCN MKR DOCD: CPT | Performed by: ORTHOPAEDIC SURGERY

## 2025-01-20 RX ORDER — DICLOFENAC SODIUM 75 MG/1
75 TABLET, DELAYED RELEASE ORAL 2 TIMES DAILY
Qty: 60 TABLET | Refills: 2 | Status: SHIPPED | OUTPATIENT
Start: 2025-01-20

## 2025-01-20 NOTE — PROGRESS NOTES
Chief Complaint    Follow-up (Right shoulder )      History of Present Illness:  Charlie An is a 64 y.o. male here today for follow up evaluation of the right shoulder. He has known glenohumeral arthritis of the right shoulder. Treatment has included formal, supervised physical therapy, a prescription of meloxicam, and he underwent a corticosteroid injection on 12/30/2024 with minimal improvement. He is unable to take the meloxicam due to brain fog side effects. He continues to complain of anterior right shoulder pain with limited range of motion. Pain is worse with physical activity. Denies any new injuries.    Medical History:  Patient's medications, allergies, past medical, surgical, social and family histories were reviewed and updated as appropriate.    Pertinent items are noted in HPI  Review of systems reviewed from Patient History Form completed today and available in the patient's chart under the Media tab.       Pain Assessment  Location of Pain: Shoulder  Location Modifiers: Right  Severity of Pain: 2  Quality of Pain: Dull  Duration of Pain: A few minutes  Frequency of Pain: Intermittent  Aggravating Factors: Exercise (physical activity)  Limiting Behavior: Yes  Relieving Factors: Rest  Result of Injury: No  Work-Related Injury: No  Are there other pain locations you wish to document?: No    Past Medical History:   Diagnosis Date    Allergic rhinitis     CAD (coronary artery disease)     saw in past 6 months and no issues    Cardiomyopathy (HCC)     resolved?    Colon polyp     Glenohumeral arthritis 2/17/2014    HTN (hypertension)     Hyperlipidemia     IBS (irritable bowel syndrome)     Low back pain     Lumbar disc herniation     Rectal lesion     Rotator cuff tear, left     Rotator cuff tendonitis 2/17/2014    Shoulder pain 2/17/2014        Past Surgical History:   Procedure Laterality Date    COLONOSCOPY N/A 5/19/2022    COLORECTAL CANCER SCREENING, NOT HIGH RISK performed by Castillo Fuentes,

## 2025-02-21 ENCOUNTER — OFFICE VISIT (OUTPATIENT)
Dept: PRIMARY CARE CLINIC | Age: 65
End: 2025-02-21

## 2025-02-21 VITALS
DIASTOLIC BLOOD PRESSURE: 87 MMHG | OXYGEN SATURATION: 100 % | SYSTOLIC BLOOD PRESSURE: 147 MMHG | WEIGHT: 211 LBS | HEIGHT: 70 IN | RESPIRATION RATE: 18 BRPM | HEART RATE: 70 BPM | BODY MASS INDEX: 30.21 KG/M2

## 2025-02-21 DIAGNOSIS — I10 ESSENTIAL HYPERTENSION: ICD-10-CM

## 2025-02-21 DIAGNOSIS — M19.011 PRIMARY OSTEOARTHRITIS OF RIGHT SHOULDER: ICD-10-CM

## 2025-02-21 DIAGNOSIS — Z00.00 INITIAL MEDICARE ANNUAL WELLNESS VISIT: Primary | ICD-10-CM

## 2025-02-21 DIAGNOSIS — I44.1 MOBITZ TYPE II ATRIOVENTRICULAR BLOCK: ICD-10-CM

## 2025-02-21 DIAGNOSIS — J31.0 CHRONIC RHINITIS: ICD-10-CM

## 2025-02-21 DIAGNOSIS — Z95.0 S/P PLACEMENT OF CARDIAC PACEMAKER: ICD-10-CM

## 2025-02-21 DIAGNOSIS — E78.5 HYPERLIPIDEMIA LDL GOAL <100: ICD-10-CM

## 2025-02-21 DIAGNOSIS — I42.8 NONISCHEMIC CARDIOMYOPATHY (HCC): ICD-10-CM

## 2025-02-21 SDOH — ECONOMIC STABILITY: FOOD INSECURITY: WITHIN THE PAST 12 MONTHS, YOU WORRIED THAT YOUR FOOD WOULD RUN OUT BEFORE YOU GOT MONEY TO BUY MORE.: NEVER TRUE

## 2025-02-21 SDOH — ECONOMIC STABILITY: FOOD INSECURITY: WITHIN THE PAST 12 MONTHS, THE FOOD YOU BOUGHT JUST DIDN'T LAST AND YOU DIDN'T HAVE MONEY TO GET MORE.: NEVER TRUE

## 2025-02-21 ASSESSMENT — LIFESTYLE VARIABLES
HAVE YOU OR SOMEONE ELSE BEEN INJURED AS A RESULT OF YOUR DRINKING: NO
HOW OFTEN DURING THE LAST YEAR HAVE YOU NEEDED AN ALCOHOLIC DRINK FIRST THING IN THE MORNING TO GET YOURSELF GOING AFTER A NIGHT OF HEAVY DRINKING: NEVER
HOW OFTEN DURING THE LAST YEAR HAVE YOU BEEN UNABLE TO REMEMBER WHAT HAPPENED THE NIGHT BEFORE BECAUSE YOU HAD BEEN DRINKING: NEVER
HOW OFTEN DURING THE LAST YEAR HAVE YOU FOUND THAT YOU WERE NOT ABLE TO STOP DRINKING ONCE YOU HAD STARTED: NEVER
HOW OFTEN DURING THE LAST YEAR HAVE YOU HAD A FEELING OF GUILT OR REMORSE AFTER DRINKING: NEVER
HAS A RELATIVE, FRIEND, DOCTOR, OR ANOTHER HEALTH PROFESSIONAL EXPRESSED CONCERN ABOUT YOUR DRINKING OR SUGGESTED YOU CUT DOWN: NO
HOW MANY STANDARD DRINKS CONTAINING ALCOHOL DO YOU HAVE ON A TYPICAL DAY: 5 OR 6
HOW OFTEN DURING THE LAST YEAR HAVE YOU FAILED TO DO WHAT WAS NORMALLY EXPECTED FROM YOU BECAUSE OF DRINKING: NEVER
HOW OFTEN DO YOU HAVE A DRINK CONTAINING ALCOHOL: 4 OR MORE TIMES A WEEK

## 2025-02-21 ASSESSMENT — PATIENT HEALTH QUESTIONNAIRE - PHQ9
SUM OF ALL RESPONSES TO PHQ QUESTIONS 1-9: 0
SUM OF ALL RESPONSES TO PHQ QUESTIONS 1-9: 0
1. LITTLE INTEREST OR PLEASURE IN DOING THINGS: NOT AT ALL
2. FEELING DOWN, DEPRESSED OR HOPELESS: NOT AT ALL
SUM OF ALL RESPONSES TO PHQ9 QUESTIONS 1 & 2: 0
SUM OF ALL RESPONSES TO PHQ QUESTIONS 1-9: 0
SUM OF ALL RESPONSES TO PHQ QUESTIONS 1-9: 0

## 2025-02-21 NOTE — PROGRESS NOTES
Medicare Annual Wellness Visit    Charlie An is here for Medicare AWV    Assessment & Plan   Initial Medicare annual wellness visit  -Patient lives with his wife.  He is still physically active, go to the gym regularly, able to do basic activities of daily living.  He has no Alzheimer's dementia.  -     PSA Screening; Future    Essential hypertension  -He has a whitecoat syndrome.  He takes Toprol- mg twice daily.  He is no longer on Lotensin after he was started on Entresto for cardiomyopathy.  -     CBC with Auto Differential; Future  -     Comprehensive Metabolic Panel; Future  -     Uric Acid; Future    Nonischemic cardiomyopathy (HCC)  He goes to cardiologist at [facility].  He takes Entresto and Toprol  -     Lipid Panel; Future    Mobitz type II atrioventricular block  S/P placement of cardiac pacemaker  -He is stable.  Follow-up with EP cardiologist for regular device interrogation.    Hyperlipidemia LDL goal <100  -Controlled.  Continue Lipitor 40 mg daily.  -     Lipid Panel; Future    Chronic rhinitis  -He goes to ENT due to recurrent rhinitis/ sinusitis.  He takes Flonase and oral antihistamine    Primary osteoarthritis of right shoulder  -He goes to orthopedic Dr. Guevara.  May take Tylenol.  Will add Voltaren gel  -     diclofenac sodium (VOLTAREN) 1 % GEL; Apply 2 g topically 4 times daily, Topical, 4 TIMES DAILY Starting Fri 2/21/2025, Disp-100 g, R-3, Normal       Return in 1 year (on 2/21/2026) for Medicare AWV.     Subjective   As above    Patient's complete Health Risk Assessment and screening values have been reviewed and are found in Flowsheets. The following problems were reviewed today and where indicated follow up appointments were made and/or referrals ordered.    Positive Risk Factor Screenings with Interventions:       Alcohol Screening:  AUDIT-C Score: 10  AUDIT Total Score: 10  Total Score Interpretation: 8-14 suggests harmful or hazardous alcohol consumption in men

## 2025-02-21 NOTE — PATIENT INSTRUCTIONS
Health. If you have questions about a medical condition or this instruction, always ask your healthcare professional. [employer], disclaims any warranty or liability for your use of this information.       Learning About Being Active as an Older Adult  Why is being active important as you get older?     Being active is one of the best things you can do for your health. And it's never too late to start. Being active--or getting active, if you aren't already--has definite benefits. It can:  Give you more energy,  Keep your mind sharp.  Improve balance to reduce your risk of falls.  Help you manage chronic illness with fewer medicines.  No matter how old you are, how fit you are, or what health problems you have, there is a form of activity that will work for you. And the more physical activity you can do, the better your overall health will be.  What kinds of activity can help you stay healthy?  Being more active will make your daily activities easier. Physical activity includes planned exercise and things you do in daily life. There are four types of activity:  Aerobic.  Doing aerobic activity makes your heart and lungs strong.  Includes walking, dancing, and gardening.  Aim for at least 2½ hours spread throughout the week.  It improves your energy and can help you sleep better.  Muscle-strengthening.  This type of activity can help maintain muscle and strengthen bones.  Includes climbing stairs, using resistance bands, and lifting or carrying heavy loads.  Aim for at least twice a week.  It can help protect the knees and other joints.  Stretching.  Stretching gives you better range of motion in joints and muscles.  Includes upper arm stretches, calf stretches, and gentle yoga.  Aim for at least twice a week, preferably after your muscles are warmed up from other activities.  It can help you function better in daily life.  Balancing.  This helps you stay coordinated and have good posture.  Includes

## 2025-03-07 LAB
ALBUMIN: 4.3 G/DL (ref 3.5–5.7)
ALP BLD-CCNC: 97 IU/L (ref 35–135)
ALT SERPL-CCNC: 24 IU/L (ref 10–60)
ANION GAP SERPL CALCULATED.3IONS-SCNC: 6 MMOL/L (ref 4–16)
AST SERPL-CCNC: 33 IU/L (ref 10–40)
BASOPHILS ABSOLUTE: 0.1 THOU/MCL (ref 0–0.2)
BASOPHILS ABSOLUTE: 1 %
BILIRUB SERPL-MCNC: 0.7 MG/DL (ref 0–1.2)
BUN BLDV-MCNC: 5 MG/DL (ref 8–26)
CALCIUM SERPL-MCNC: 9.6 MG/DL (ref 8.5–10.4)
CHLORIDE BLD-SCNC: 97 MMOL/L (ref 98–111)
CHOLESTEROL, TOTAL: 141 MG/DL
CO2: 27 MMOL/L (ref 21–31)
CREAT SERPL-MCNC: 0.64 MG/DL (ref 0.7–1.3)
EGFR (CKD-EPI): 105 ML/MIN/1.73 M2
EOSINOPHILS ABSOLUTE: 0.5 THOU/MCL (ref 0.03–0.45)
EOSINOPHILS RELATIVE PERCENT: 11 %
GLUCOSE BLD-MCNC: 94 MG/DL (ref 70–99)
HCT VFR BLD CALC: 49.8 % (ref 40–50)
HDLC SERPL-MCNC: 52 MG/DL
HEMOGLOBIN: 17 G/DL (ref 13.5–16.5)
LDL CHOLESTEROL: 70 MG/DL
LYMPHOCYTES ABSOLUTE: 1.3 THOU/MCL (ref 1–4)
LYMPHOCYTES RELATIVE PERCENT: 29 %
MCH RBC QN AUTO: 33.5 PG (ref 27–33)
MCHC RBC AUTO-ENTMCNC: 34.1 G/DL (ref 32–36)
MCV RBC AUTO: 98.3 FL (ref 82–97)
MONOCYTES ABSOLUTE: 0.7 THOU/MCL (ref 0.2–0.9)
MONOCYTES RELATIVE PERCENT: 16 %
NEUTROPHILS ABSOLUTE: 1.9 THOU/MCL (ref 1.8–7.7)
NONHDLC SERPL-MCNC: 89 MG/DL
PDW BLD-RTO: 13.1 % (ref 12.3–17)
PLATELET # BLD: 219 THOU/MCL (ref 140–375)
PMV BLD AUTO: 7.3 FL (ref 7.4–11.5)
POTASSIUM SERPL-SCNC: 4.5 MMOL/L (ref 3.6–5.1)
PROSTATE SPECIFIC ANTIGEN FREE: NORMAL
PROSTATE SPECIFIC ANTIGEN PERCENT FREE: NORMAL
PROSTATE SPECIFIC ANTIGEN: 0.89 NG/ML
PROSTATE SPECIFIC ANTIGEN: NORMAL NG/ML
RBC # BLD: 5.06 MIL/MCL (ref 4.4–5.8)
SEG NEUTROPHILS: 43 %
SODIUM BLD-SCNC: 130 MMOL/L (ref 135–145)
TOTAL PROTEIN: 7.3 G/DL (ref 6–8)
TRIGL SERPL-MCNC: 96 MG/DL
URIC ACID: 4.8 MG/DL (ref 4.4–7.8)
WBC # BLD: 4.4 THOU/MCL (ref 3.6–10.5)

## 2025-05-12 RX ORDER — SACUBITRIL AND VALSARTAN 97; 103 MG/1; MG/1
1 TABLET, FILM COATED ORAL 2 TIMES DAILY
Qty: 180 TABLET | Refills: 1 | Status: SHIPPED | OUTPATIENT
Start: 2025-05-12

## 2025-05-14 RX ORDER — METOPROLOL SUCCINATE 100 MG/1
100 TABLET, EXTENDED RELEASE ORAL 2 TIMES DAILY
Qty: 180 TABLET | Refills: 1 | Status: SHIPPED | OUTPATIENT
Start: 2025-05-14

## 2025-06-11 RX ORDER — ATORVASTATIN CALCIUM 40 MG/1
40 TABLET, FILM COATED ORAL DAILY
Qty: 90 TABLET | Refills: 1 | Status: SHIPPED | OUTPATIENT
Start: 2025-06-11

## 2025-06-11 NOTE — TELEPHONE ENCOUNTER
Medication:   Requested Prescriptions     Pending Prescriptions Disp Refills    atorvastatin (LIPITOR) 40 MG tablet [Pharmacy Med Name: ATORVASTATIN 40 MG TABLET] 90 tablet 1     Sig: TAKE 1 TABLET BY MOUTH EVERY DAY       Last Filled:      Patient Phone Number: [phone] (home) [phone] (work)    Last appt: 2/21/2025   Next appt: 8/21/2025    Last Lipid:   Lab Results   Component Value Date/Time    CHOL 141 03/07/2025 09:52 AM    TRIG 96 03/07/2025 09:52 AM    HDL 52 03/07/2025 09:52 AM

## 2025-06-25 ENCOUNTER — OFFICE VISIT (OUTPATIENT)
Dept: PRIMARY CARE CLINIC | Age: 65
End: 2025-06-25
Payer: MEDICARE

## 2025-06-25 VITALS
HEIGHT: 70 IN | WEIGHT: 206 LBS | DIASTOLIC BLOOD PRESSURE: 83 MMHG | SYSTOLIC BLOOD PRESSURE: 133 MMHG | BODY MASS INDEX: 29.49 KG/M2 | TEMPERATURE: 97.6 F | HEART RATE: 82 BPM | OXYGEN SATURATION: 98 %

## 2025-06-25 DIAGNOSIS — I10 ESSENTIAL HYPERTENSION: ICD-10-CM

## 2025-06-25 DIAGNOSIS — I44.1 MOBITZ TYPE II ATRIOVENTRICULAR BLOCK: ICD-10-CM

## 2025-06-25 DIAGNOSIS — Z95.0 S/P PLACEMENT OF CARDIAC PACEMAKER: ICD-10-CM

## 2025-06-25 DIAGNOSIS — L98.9 FACIAL LESION: Primary | ICD-10-CM

## 2025-06-25 DIAGNOSIS — F41.1 GAD (GENERALIZED ANXIETY DISORDER): ICD-10-CM

## 2025-06-25 DIAGNOSIS — I42.8 NONISCHEMIC CARDIOMYOPATHY (HCC): ICD-10-CM

## 2025-06-25 PROCEDURE — 3079F DIAST BP 80-89 MM HG: CPT | Performed by: FAMILY MEDICINE

## 2025-06-25 PROCEDURE — 3075F SYST BP GE 130 - 139MM HG: CPT | Performed by: FAMILY MEDICINE

## 2025-06-25 PROCEDURE — 1159F MED LIST DOCD IN RCRD: CPT | Performed by: FAMILY MEDICINE

## 2025-06-25 PROCEDURE — 99214 OFFICE O/P EST MOD 30 MIN: CPT | Performed by: FAMILY MEDICINE

## 2025-06-25 PROCEDURE — 1123F ACP DISCUSS/DSCN MKR DOCD: CPT | Performed by: FAMILY MEDICINE

## 2025-06-25 ASSESSMENT — ENCOUNTER SYMPTOMS
VOICE CHANGE: 0
BLOOD IN STOOL: 0
ABDOMINAL PAIN: 0
SHORTNESS OF BREATH: 0
CONSTIPATION: 0
DIARRHEA: 0
TROUBLE SWALLOWING: 0

## 2025-06-25 NOTE — PROGRESS NOTES
2025     Charlie An (:  1960) is a 65 y.o. male, here for evaluation of the following medical concerns:    HPI  Patient is 65 years old white male medical history significant for hypertension, nonischemic cardiomyopathy, Mobitz type II AV block status post pacemaker placement, hyperlipidemia, chronic rhinitis and osteoarthritis of right shoulder.  He presented to the office for follow-up.  He complain of facial lesion on the left side as well as left temporal area for months.  He has upcoming appointment with  dermatology sometime October but she is anxious about that the nature of the lesion and willing to have some mildly taken look for biopsy..  He he has hypertension and ischemic cardiomyopathy is stable.    Review of Systems   Constitutional:  Negative for activity change.   HENT:  Negative for trouble swallowing and voice change.    Eyes:  Negative for visual disturbance.   Respiratory:  Negative for shortness of breath.    Cardiovascular:  Negative for chest pain and leg swelling.   Gastrointestinal:  Negative for abdominal pain, blood in stool, constipation and diarrhea.   Genitourinary:  Negative for difficulty urinating, dysuria, frequency, hematuria and scrotal swelling.   Musculoskeletal:  Negative for arthralgias and myalgias.   Skin:  Negative for rash.   Neurological:  Negative for dizziness.   Psychiatric/Behavioral:  Negative for behavioral problems.        Prior to Visit Medications    Medication Sig Taking? Authorizing Provider   diclofenac sodium (VOLTAREN) 1 % GEL Apply 2 g topically 4 times daily Yes Mukund Spencer MD   atorvastatin (LIPITOR) 40 MG tablet TAKE 1 TABLET BY MOUTH EVERY DAY Yes Mukund Spencer MD   metoprolol succinate (TOPROL XL) 100 MG extended release tablet Take 1 tablet by mouth 2 times daily Yes Mukund Spencer MD   sacubitril-valsartan (ENTRESTO)  MG per tablet Take 1 tablet by mouth 2 times daily Yes Mukund Spencer MD   aspirin 81

## 2025-07-22 ENCOUNTER — OFFICE VISIT (OUTPATIENT)
Dept: ENT CLINIC | Age: 65
End: 2025-07-22

## 2025-07-22 VITALS
DIASTOLIC BLOOD PRESSURE: 75 MMHG | WEIGHT: 204 LBS | OXYGEN SATURATION: 98 % | SYSTOLIC BLOOD PRESSURE: 138 MMHG | HEART RATE: 77 BPM | HEIGHT: 70 IN | BODY MASS INDEX: 29.2 KG/M2

## 2025-07-22 DIAGNOSIS — H69.92 DYSFUNCTION OF LEFT EUSTACHIAN TUBE: ICD-10-CM

## 2025-07-22 DIAGNOSIS — H93.8X2 SENSATION OF FULLNESS IN LEFT EAR: ICD-10-CM

## 2025-07-22 DIAGNOSIS — D48.5 NEOPLASM OF UNCERTAIN BEHAVIOR OF SKIN: Primary | ICD-10-CM

## 2025-07-22 DIAGNOSIS — Z86.69 HISTORY OF OTITIS EXTERNA: ICD-10-CM

## 2025-07-22 RX ORDER — NEOMYCIN SULFATE, POLYMYXIN B SULFATE AND HYDROCORTISONE 3.5; 10000; 1 MG/ML; [IU]/ML; MG/ML
4 SOLUTION AURICULAR (OTIC) 3 TIMES DAILY
Qty: 5 ML | Refills: 0 | Status: SHIPPED | OUTPATIENT
Start: 2025-07-22 | End: 2025-07-27

## 2025-07-22 RX ORDER — LIDOCAINE HYDROCHLORIDE AND EPINEPHRINE BITARTRATE 20; .01 MG/ML; MG/ML
3 INJECTION, SOLUTION SUBCUTANEOUS ONCE
Status: COMPLETED | OUTPATIENT
Start: 2025-07-22 | End: 2025-07-22

## 2025-07-22 RX ADMIN — LIDOCAINE HYDROCHLORIDE AND EPINEPHRINE BITARTRATE 3 ML: 20; .01 INJECTION, SOLUTION SUBCUTANEOUS at 13:31

## 2025-07-22 NOTE — PROGRESS NOTES
[facility]  DIVISION OF OTOLARYNGOLOGY- HEAD & NECK SURGERY  CONSULT      Charlie An (:  1960) is a 65 y.o. male, here for evaluation of the following chief complaint(s):  Lesion(s) (Left side face ) and Cerumen Impaction (Left ear)        ASSESSMENT/PLAN:  1. Neoplasm of uncertain behavior of skin  -     lidocaine-EPINEPHrine 2%-1:[id number] injection 3 mL; 3 mL, Other, ONCE, 1 dose, On Tu25 at 1400  -     Surgical Pathology  -     Surgical Pathology  2. History of otitis externa  3. Dysfunction of left eustachian tube  4. Sensation of fullness in left ear        This is a very pleasant 65 y.o. male here today for evaluation of the the above-noted complaints.      -Biopsy of left temple and left cheek lesions.  Will call the patient with the results.  We discussed removal in the office versus treatment in the OR if they are a cancer.  -Dry ear precautions discussed with the patient  -Previous Culture taken from right ear grew Aspergillus fumigatus.    -Trial of Cortisporin to see if some of his symptoms are related to external auditory canal issues.  Ear canal appears clear on exam today.  - Suspect that some of his symptoms are related to eustachian tube dysfunction.  Increase fluticasone to 2 sprays twice daily.      Medical Decision Making:  The following items were considered in medical decision making:  Independent review of images  Review / order clinical lab tests  Review / order radiology tests  Decision to obtain old records  Review and summation of old records as accessed through [facility] if applicable    SUBJECTIVE/OBJECTIVE:  HPI    Charlie An is here today for evaluation of     Patient has a history of vasomotor rhinitis.  He also has associated chronic respiratory symptoms as a result of the chronic nasal drainage.  He has been having issues related to severe nasal congestion, ear fullness and facial pain and pressure.  No evidence of fevers.  No purulent drainage.  He was

## 2025-07-28 ENCOUNTER — RESULTS FOLLOW-UP (OUTPATIENT)
Dept: ENT CLINIC | Age: 65
End: 2025-07-28

## 2025-07-28 RX ORDER — SILDENAFIL 100 MG/1
100 TABLET, FILM COATED ORAL DAILY PRN
Qty: 30 TABLET | Refills: 3 | Status: SHIPPED | OUTPATIENT
Start: 2025-07-28

## 2025-07-28 NOTE — TELEPHONE ENCOUNTER
Medication:   Requested Prescriptions     Pending Prescriptions Disp Refills    sildenafil (VIAGRA) 100 MG tablet [Pharmacy Med Name: SILDENAFIL 100 MG TABLET] 30 tablet 3     Sig: TAKE 1 TABLET BY MOUTH DAILY AS NEEDED FOR ERECTILE DYSFUNCTION        Last Filled:      Patient Phone Number: [phone] (home) [phone] (work)    Last appt: 6/25/2025   Next appt: 8/21/2025    Last OARRS:        No data to display

## 2025-07-28 NOTE — TELEPHONE ENCOUNTER
----- Message from Dr. Sal Byrd MD sent at 7/25/2025 12:28 PM EDT -----  Please call the patient and let him know that the biopsy of both skin lesions were benign, AKA no evidence of cancer..  They can be removed in the office if he desires or we can just observe them.  ----- Message -----  From: [facility] Incoming Lab Results From Soft (Epic Adt)  Sent: 7/24/2025   4:14 PM EDT  To: Sal Byrd MD

## 2025-08-05 ENCOUNTER — TELEPHONE (OUTPATIENT)
Dept: ENT CLINIC | Age: 65
End: 2025-08-05

## 2025-08-13 ENCOUNTER — OFFICE VISIT (OUTPATIENT)
Dept: ENT CLINIC | Age: 65
End: 2025-08-13

## 2025-08-13 VITALS
SYSTOLIC BLOOD PRESSURE: 136 MMHG | BODY MASS INDEX: 29.49 KG/M2 | WEIGHT: 206 LBS | HEART RATE: 68 BPM | DIASTOLIC BLOOD PRESSURE: 78 MMHG | HEIGHT: 70 IN

## 2025-08-13 DIAGNOSIS — D48.5 NEOPLASM OF UNCERTAIN BEHAVIOR OF SKIN: Primary | ICD-10-CM

## 2025-08-13 RX ORDER — LIDOCAINE HYDROCHLORIDE AND EPINEPHRINE BITARTRATE 20; .01 MG/ML; MG/ML
3 INJECTION, SOLUTION SUBCUTANEOUS ONCE
Status: COMPLETED | OUTPATIENT
Start: 2025-08-13 | End: 2025-08-13

## 2025-08-13 RX ORDER — CEPHALEXIN 500 MG/1
500 CAPSULE ORAL 2 TIMES DAILY
Qty: 14 CAPSULE | Refills: 0 | Status: SHIPPED | OUTPATIENT
Start: 2025-08-13 | End: 2025-08-20

## 2025-08-13 RX ADMIN — LIDOCAINE HYDROCHLORIDE AND EPINEPHRINE BITARTRATE 3 ML: 20; .01 INJECTION, SOLUTION SUBCUTANEOUS at 15:33

## 2025-08-13 RX ADMIN — LIDOCAINE HYDROCHLORIDE AND EPINEPHRINE BITARTRATE 3 ML: 20; .01 INJECTION, SOLUTION SUBCUTANEOUS at 14:47

## 2025-08-20 ENCOUNTER — OFFICE VISIT (OUTPATIENT)
Dept: ENT CLINIC | Age: 65
End: 2025-08-20
Payer: MEDICARE

## 2025-08-20 VITALS
BODY MASS INDEX: 29.35 KG/M2 | SYSTOLIC BLOOD PRESSURE: 131 MMHG | HEART RATE: 74 BPM | DIASTOLIC BLOOD PRESSURE: 78 MMHG | OXYGEN SATURATION: 97 % | HEIGHT: 70 IN | WEIGHT: 205 LBS

## 2025-08-20 DIAGNOSIS — L82.1 SEBORRHEIC KERATOSIS: Primary | ICD-10-CM

## 2025-08-20 PROCEDURE — 99212 OFFICE O/P EST SF 10 MIN: CPT | Performed by: NURSE PRACTITIONER

## 2025-08-20 PROCEDURE — 3075F SYST BP GE 130 - 139MM HG: CPT | Performed by: NURSE PRACTITIONER

## 2025-08-20 PROCEDURE — 1159F MED LIST DOCD IN RCRD: CPT | Performed by: NURSE PRACTITIONER

## 2025-08-20 PROCEDURE — 1123F ACP DISCUSS/DSCN MKR DOCD: CPT | Performed by: NURSE PRACTITIONER

## 2025-08-20 PROCEDURE — 3078F DIAST BP <80 MM HG: CPT | Performed by: NURSE PRACTITIONER

## 2025-08-25 ENCOUNTER — TELEPHONE (OUTPATIENT)
Dept: ENT CLINIC | Age: 65
End: 2025-08-25

## 2025-08-25 ENCOUNTER — PATIENT MESSAGE (OUTPATIENT)
Dept: ENT CLINIC | Age: 65
End: 2025-08-25

## 2025-08-25 DIAGNOSIS — L08.9 SKIN INFECTION: Primary | ICD-10-CM

## 2025-08-25 RX ORDER — CEPHALEXIN 500 MG/1
500 CAPSULE ORAL 2 TIMES DAILY
Qty: 14 CAPSULE | Refills: 0 | Status: SHIPPED | OUTPATIENT
Start: 2025-08-25 | End: 2025-09-01

## 2025-08-27 ENCOUNTER — OFFICE VISIT (OUTPATIENT)
Dept: PRIMARY CARE CLINIC | Age: 65
End: 2025-08-27
Payer: MEDICARE

## 2025-08-27 VITALS
SYSTOLIC BLOOD PRESSURE: 144 MMHG | BODY MASS INDEX: 29.44 KG/M2 | HEART RATE: 75 BPM | WEIGHT: 205.2 LBS | DIASTOLIC BLOOD PRESSURE: 84 MMHG | OXYGEN SATURATION: 98 %

## 2025-08-27 DIAGNOSIS — Z95.0 S/P PLACEMENT OF CARDIAC PACEMAKER: ICD-10-CM

## 2025-08-27 DIAGNOSIS — I42.8 NONISCHEMIC CARDIOMYOPATHY (HCC): ICD-10-CM

## 2025-08-27 DIAGNOSIS — I44.1 MOBITZ TYPE II ATRIOVENTRICULAR BLOCK: ICD-10-CM

## 2025-08-27 DIAGNOSIS — I10 ESSENTIAL HYPERTENSION: ICD-10-CM

## 2025-08-27 DIAGNOSIS — J00 ACUTE RHINITIS: Primary | ICD-10-CM

## 2025-08-27 PROCEDURE — 3079F DIAST BP 80-89 MM HG: CPT | Performed by: FAMILY MEDICINE

## 2025-08-27 PROCEDURE — 99214 OFFICE O/P EST MOD 30 MIN: CPT | Performed by: FAMILY MEDICINE

## 2025-08-27 PROCEDURE — 1123F ACP DISCUSS/DSCN MKR DOCD: CPT | Performed by: FAMILY MEDICINE

## 2025-08-27 PROCEDURE — 3077F SYST BP >= 140 MM HG: CPT | Performed by: FAMILY MEDICINE

## 2025-08-27 PROCEDURE — 1159F MED LIST DOCD IN RCRD: CPT | Performed by: FAMILY MEDICINE

## 2025-08-27 RX ORDER — METHYLPREDNISOLONE 4 MG/1
TABLET ORAL
Qty: 1 KIT | Refills: 0 | Status: SHIPPED | OUTPATIENT
Start: 2025-08-27 | End: 2025-09-02

## 2025-08-27 ASSESSMENT — ENCOUNTER SYMPTOMS
TROUBLE SWALLOWING: 0
VOICE CHANGE: 0
ABDOMINAL PAIN: 0
CONSTIPATION: 0
BLOOD IN STOOL: 0
SHORTNESS OF BREATH: 0
DIARRHEA: 0

## 2025-09-02 ENCOUNTER — OFFICE VISIT (OUTPATIENT)
Dept: ENT CLINIC | Age: 65
End: 2025-09-02
Payer: MEDICARE

## 2025-09-02 VITALS
HEIGHT: 70 IN | BODY MASS INDEX: 29.35 KG/M2 | OXYGEN SATURATION: 96 % | SYSTOLIC BLOOD PRESSURE: 145 MMHG | DIASTOLIC BLOOD PRESSURE: 82 MMHG | HEART RATE: 71 BPM | WEIGHT: 205 LBS

## 2025-09-02 DIAGNOSIS — L82.1 SEBORRHEIC KERATOSIS: ICD-10-CM

## 2025-09-02 DIAGNOSIS — T81.30XA WOUND DEHISCENCE: ICD-10-CM

## 2025-09-02 DIAGNOSIS — L08.9 SKIN INFECTION: Primary | ICD-10-CM

## 2025-09-02 PROCEDURE — 1159F MED LIST DOCD IN RCRD: CPT | Performed by: NURSE PRACTITIONER

## 2025-09-02 PROCEDURE — 1123F ACP DISCUSS/DSCN MKR DOCD: CPT | Performed by: NURSE PRACTITIONER

## 2025-09-02 PROCEDURE — 3079F DIAST BP 80-89 MM HG: CPT | Performed by: NURSE PRACTITIONER

## 2025-09-02 PROCEDURE — 3077F SYST BP >= 140 MM HG: CPT | Performed by: NURSE PRACTITIONER

## 2025-09-02 PROCEDURE — 99213 OFFICE O/P EST LOW 20 MIN: CPT | Performed by: NURSE PRACTITIONER
